# Patient Record
Sex: FEMALE | Race: WHITE | Employment: OTHER | URBAN - METROPOLITAN AREA
[De-identification: names, ages, dates, MRNs, and addresses within clinical notes are randomized per-mention and may not be internally consistent; named-entity substitution may affect disease eponyms.]

---

## 2023-10-22 ENCOUNTER — APPOINTMENT (OUTPATIENT)
Dept: GENERAL RADIOLOGY | Age: 83
DRG: 382 | End: 2023-10-22
Payer: MEDICARE

## 2023-10-22 ENCOUNTER — APPOINTMENT (OUTPATIENT)
Dept: CT IMAGING | Age: 83
DRG: 382 | End: 2023-10-22
Payer: MEDICARE

## 2023-10-22 ENCOUNTER — HOSPITAL ENCOUNTER (INPATIENT)
Age: 83
LOS: 2 days | Discharge: HOME HEALTH | DRG: 382 | End: 2023-10-24
Attending: EMERGENCY MEDICINE | Admitting: INTERNAL MEDICINE
Payer: MEDICARE

## 2023-10-22 DIAGNOSIS — R11.2 INTRACTABLE VOMITING WITH NAUSEA: Primary | ICD-10-CM

## 2023-10-22 DIAGNOSIS — R33.9 URINARY RETENTION: ICD-10-CM

## 2023-10-22 DIAGNOSIS — K20.90 ESOPHAGITIS: ICD-10-CM

## 2023-10-22 DIAGNOSIS — R94.31 PROLONGED Q-T INTERVAL ON ECG: ICD-10-CM

## 2023-10-22 DIAGNOSIS — R11.2 NAUSEA AND VOMITING, UNSPECIFIED VOMITING TYPE: ICD-10-CM

## 2023-10-22 PROBLEM — R13.10 ODYNOPHAGIA: Status: ACTIVE | Noted: 2023-10-22

## 2023-10-22 PROBLEM — K92.0 COFFEE GROUND EMESIS: Status: ACTIVE | Noted: 2023-10-22

## 2023-10-22 PROBLEM — I16.0 HYPERTENSIVE URGENCY: Status: ACTIVE | Noted: 2023-10-22

## 2023-10-22 LAB
ALBUMIN SERPL-MCNC: 3.8 G/DL (ref 3.4–5)
ALP SERPL-CCNC: 87 U/L (ref 40–129)
ALT SERPL-CCNC: 15 U/L (ref 10–40)
AMORPH SED URNS QL MICRO: ABNORMAL /HPF
ANION GAP SERPL CALCULATED.3IONS-SCNC: 14 MMOL/L (ref 3–16)
AST SERPL-CCNC: 20 U/L (ref 15–37)
BACTERIA URNS QL MICRO: ABNORMAL /HPF
BASOPHILS # BLD: 0.1 K/UL (ref 0–0.2)
BASOPHILS NFR BLD: 0.7 %
BILIRUB DIRECT SERPL-MCNC: <0.2 MG/DL (ref 0–0.3)
BILIRUB INDIRECT SERPL-MCNC: NORMAL MG/DL (ref 0–1)
BILIRUB SERPL-MCNC: 0.3 MG/DL (ref 0–1)
BILIRUB UR QL STRIP.AUTO: NEGATIVE
BUN SERPL-MCNC: 15 MG/DL (ref 7–20)
CALCIUM SERPL-MCNC: 9.2 MG/DL (ref 8.3–10.6)
CHLORIDE SERPL-SCNC: 103 MMOL/L (ref 99–110)
CLARITY UR: CLEAR
CO2 SERPL-SCNC: 22 MMOL/L (ref 21–32)
COLOR UR: YELLOW
CREAT SERPL-MCNC: 0.9 MG/DL (ref 0.6–1.2)
DEPRECATED RDW RBC AUTO: 14.1 % (ref 12.4–15.4)
EOSINOPHIL # BLD: 0.1 K/UL (ref 0–0.6)
EOSINOPHIL NFR BLD: 1.1 %
EPI CELLS #/AREA URNS HPF: ABNORMAL /HPF (ref 0–5)
GFR SERPLBLD CREATININE-BSD FMLA CKD-EPI: >60 ML/MIN/{1.73_M2}
GLUCOSE SERPL-MCNC: 148 MG/DL (ref 70–99)
GLUCOSE UR STRIP.AUTO-MCNC: NEGATIVE MG/DL
HCT VFR BLD AUTO: 39.4 % (ref 36–48)
HGB BLD-MCNC: 13.4 G/DL (ref 12–16)
HGB UR QL STRIP.AUTO: ABNORMAL
KETONES UR STRIP.AUTO-MCNC: ABNORMAL MG/DL
LACTATE BLDV-SCNC: 0.9 MMOL/L (ref 0.4–1.9)
LEUKOCYTE ESTERASE UR QL STRIP.AUTO: NEGATIVE
LIPASE SERPL-CCNC: 41 U/L (ref 13–60)
LYMPHOCYTES # BLD: 1.7 K/UL (ref 1–5.1)
LYMPHOCYTES NFR BLD: 19.1 %
MCH RBC QN AUTO: 31 PG (ref 26–34)
MCHC RBC AUTO-ENTMCNC: 34.1 G/DL (ref 31–36)
MCV RBC AUTO: 91 FL (ref 80–100)
MONOCYTES # BLD: 0.5 K/UL (ref 0–1.3)
MONOCYTES NFR BLD: 5.8 %
NEUTROPHILS # BLD: 6.3 K/UL (ref 1.7–7.7)
NEUTROPHILS NFR BLD: 73.3 %
NITRITE UR QL STRIP.AUTO: NEGATIVE
NT-PROBNP SERPL-MCNC: 160 PG/ML (ref 0–449)
PH UR STRIP.AUTO: 7.5 [PH] (ref 5–8)
PLATELET # BLD AUTO: 234 K/UL (ref 135–450)
PMV BLD AUTO: 9.9 FL (ref 5–10.5)
POTASSIUM SERPL-SCNC: 3.7 MMOL/L (ref 3.5–5.1)
PROT SERPL-MCNC: 7.3 G/DL (ref 6.4–8.2)
PROT UR STRIP.AUTO-MCNC: 30 MG/DL
RBC # BLD AUTO: 4.33 M/UL (ref 4–5.2)
RBC #/AREA URNS HPF: ABNORMAL /HPF (ref 0–4)
SARS-COV-2 RDRP RESP QL NAA+PROBE: NOT DETECTED
SODIUM SERPL-SCNC: 139 MMOL/L (ref 136–145)
SP GR UR STRIP.AUTO: 1.01 (ref 1–1.03)
TROPONIN, HIGH SENSITIVITY: 10 NG/L (ref 0–14)
TROPONIN, HIGH SENSITIVITY: 10 NG/L (ref 0–14)
UA COMPLETE W REFLEX CULTURE PNL UR: ABNORMAL
UA DIPSTICK W REFLEX MICRO PNL UR: YES
URN SPEC COLLECT METH UR: ABNORMAL
UROBILINOGEN UR STRIP-ACNC: 0.2 E.U./DL
WBC # BLD AUTO: 8.6 K/UL (ref 4–11)
WBC #/AREA URNS HPF: ABNORMAL /HPF (ref 0–5)

## 2023-10-22 PROCEDURE — 6370000000 HC RX 637 (ALT 250 FOR IP): Performed by: INTERNAL MEDICINE

## 2023-10-22 PROCEDURE — 2500000003 HC RX 250 WO HCPCS: Performed by: EMERGENCY MEDICINE

## 2023-10-22 PROCEDURE — 93005 ELECTROCARDIOGRAM TRACING: CPT | Performed by: EMERGENCY MEDICINE

## 2023-10-22 PROCEDURE — C9113 INJ PANTOPRAZOLE SODIUM, VIA: HCPCS | Performed by: EMERGENCY MEDICINE

## 2023-10-22 PROCEDURE — 2580000003 HC RX 258: Performed by: INTERNAL MEDICINE

## 2023-10-22 PROCEDURE — 87635 SARS-COV-2 COVID-19 AMP PRB: CPT

## 2023-10-22 PROCEDURE — 51702 INSERT TEMP BLADDER CATH: CPT

## 2023-10-22 PROCEDURE — 83880 ASSAY OF NATRIURETIC PEPTIDE: CPT

## 2023-10-22 PROCEDURE — A4216 STERILE WATER/SALINE, 10 ML: HCPCS | Performed by: EMERGENCY MEDICINE

## 2023-10-22 PROCEDURE — 83690 ASSAY OF LIPASE: CPT

## 2023-10-22 PROCEDURE — 83605 ASSAY OF LACTIC ACID: CPT

## 2023-10-22 PROCEDURE — 80076 HEPATIC FUNCTION PANEL: CPT

## 2023-10-22 PROCEDURE — C9113 INJ PANTOPRAZOLE SODIUM, VIA: HCPCS | Performed by: INTERNAL MEDICINE

## 2023-10-22 PROCEDURE — 2580000003 HC RX 258: Performed by: EMERGENCY MEDICINE

## 2023-10-22 PROCEDURE — 71045 X-RAY EXAM CHEST 1 VIEW: CPT

## 2023-10-22 PROCEDURE — 1200000000 HC SEMI PRIVATE

## 2023-10-22 PROCEDURE — 96375 TX/PRO/DX INJ NEW DRUG ADDON: CPT

## 2023-10-22 PROCEDURE — 6360000002 HC RX W HCPCS: Performed by: INTERNAL MEDICINE

## 2023-10-22 PROCEDURE — 96374 THER/PROPH/DIAG INJ IV PUSH: CPT

## 2023-10-22 PROCEDURE — 85025 COMPLETE CBC W/AUTO DIFF WBC: CPT

## 2023-10-22 PROCEDURE — 6360000004 HC RX CONTRAST MEDICATION: Performed by: EMERGENCY MEDICINE

## 2023-10-22 PROCEDURE — 99285 EMERGENCY DEPT VISIT HI MDM: CPT

## 2023-10-22 PROCEDURE — 74177 CT ABD & PELVIS W/CONTRAST: CPT

## 2023-10-22 PROCEDURE — 96361 HYDRATE IV INFUSION ADD-ON: CPT

## 2023-10-22 PROCEDURE — 36415 COLL VENOUS BLD VENIPUNCTURE: CPT

## 2023-10-22 PROCEDURE — 6360000002 HC RX W HCPCS: Performed by: EMERGENCY MEDICINE

## 2023-10-22 PROCEDURE — 81001 URINALYSIS AUTO W/SCOPE: CPT

## 2023-10-22 PROCEDURE — 96376 TX/PRO/DX INJ SAME DRUG ADON: CPT

## 2023-10-22 PROCEDURE — 6360000002 HC RX W HCPCS: Performed by: NURSE PRACTITIONER

## 2023-10-22 PROCEDURE — 84484 ASSAY OF TROPONIN QUANT: CPT

## 2023-10-22 PROCEDURE — 80048 BASIC METABOLIC PNL TOTAL CA: CPT

## 2023-10-22 RX ORDER — DIPHENHYDRAMINE HYDROCHLORIDE 50 MG/ML
12.5 INJECTION INTRAMUSCULAR; INTRAVENOUS ONCE
Status: COMPLETED | OUTPATIENT
Start: 2023-10-22 | End: 2023-10-22

## 2023-10-22 RX ORDER — LEVOTHYROXINE SODIUM 0.05 MG/1
50 TABLET ORAL DAILY
COMMUNITY

## 2023-10-22 RX ORDER — SODIUM CHLORIDE 9 MG/ML
INJECTION, SOLUTION INTRAVENOUS CONTINUOUS
Status: DISCONTINUED | OUTPATIENT
Start: 2023-10-22 | End: 2023-10-24

## 2023-10-22 RX ORDER — DEMECLOCYCLINE HYDROCHLORIDE 150 MG/1
150 TABLET, FILM COATED ORAL 2 TIMES DAILY
COMMUNITY

## 2023-10-22 RX ORDER — AMLODIPINE BESYLATE 2.5 MG/1
2.5 TABLET ORAL DAILY
Status: DISCONTINUED | OUTPATIENT
Start: 2023-10-22 | End: 2023-10-24 | Stop reason: HOSPADM

## 2023-10-22 RX ORDER — SODIUM CHLORIDE 0.9 % (FLUSH) 0.9 %
5-40 SYRINGE (ML) INJECTION PRN
Status: DISCONTINUED | OUTPATIENT
Start: 2023-10-22 | End: 2023-10-24 | Stop reason: HOSPADM

## 2023-10-22 RX ORDER — PANTOPRAZOLE SODIUM 40 MG/10ML
40 INJECTION, POWDER, LYOPHILIZED, FOR SOLUTION INTRAVENOUS ONCE
Status: COMPLETED | OUTPATIENT
Start: 2023-10-22 | End: 2023-10-22

## 2023-10-22 RX ORDER — SODIUM CHLORIDE 9 MG/ML
INJECTION, SOLUTION INTRAVENOUS PRN
Status: DISCONTINUED | OUTPATIENT
Start: 2023-10-22 | End: 2023-10-24 | Stop reason: HOSPADM

## 2023-10-22 RX ORDER — SODIUM CHLORIDE, SODIUM LACTATE, POTASSIUM CHLORIDE, AND CALCIUM CHLORIDE .6; .31; .03; .02 G/100ML; G/100ML; G/100ML; G/100ML
1000 INJECTION, SOLUTION INTRAVENOUS ONCE
Status: COMPLETED | OUTPATIENT
Start: 2023-10-22 | End: 2023-10-22

## 2023-10-22 RX ORDER — HYDRALAZINE HYDROCHLORIDE 20 MG/ML
10 INJECTION INTRAMUSCULAR; INTRAVENOUS EVERY 8 HOURS PRN
Status: DISCONTINUED | OUTPATIENT
Start: 2023-10-22 | End: 2023-10-24 | Stop reason: HOSPADM

## 2023-10-22 RX ORDER — PANTOPRAZOLE SODIUM 40 MG/10ML
40 INJECTION, POWDER, LYOPHILIZED, FOR SOLUTION INTRAVENOUS 2 TIMES DAILY
Status: DISCONTINUED | OUTPATIENT
Start: 2023-10-22 | End: 2023-10-24 | Stop reason: HOSPADM

## 2023-10-22 RX ORDER — ACETAMINOPHEN 325 MG/1
650 TABLET ORAL EVERY 6 HOURS PRN
Status: DISCONTINUED | OUTPATIENT
Start: 2023-10-22 | End: 2023-10-24 | Stop reason: HOSPADM

## 2023-10-22 RX ORDER — LORAZEPAM 2 MG/ML
1 INJECTION INTRAMUSCULAR ONCE
Status: COMPLETED | OUTPATIENT
Start: 2023-10-22 | End: 2023-10-22

## 2023-10-22 RX ORDER — SODIUM CHLORIDE 0.9 % (FLUSH) 0.9 %
5-40 SYRINGE (ML) INJECTION EVERY 12 HOURS SCHEDULED
Status: DISCONTINUED | OUTPATIENT
Start: 2023-10-22 | End: 2023-10-24 | Stop reason: HOSPADM

## 2023-10-22 RX ORDER — AMLODIPINE BESYLATE 2.5 MG/1
2.5 TABLET ORAL DAILY
Status: ON HOLD | COMMUNITY
End: 2023-10-24 | Stop reason: SDUPTHER

## 2023-10-22 RX ORDER — ACETAMINOPHEN 650 MG/1
650 SUPPOSITORY RECTAL EVERY 6 HOURS PRN
Status: DISCONTINUED | OUTPATIENT
Start: 2023-10-22 | End: 2023-10-24 | Stop reason: HOSPADM

## 2023-10-22 RX ORDER — ONDANSETRON 4 MG/1
4 TABLET, ORALLY DISINTEGRATING ORAL EVERY 8 HOURS PRN
Status: DISCONTINUED | OUTPATIENT
Start: 2023-10-22 | End: 2023-10-24 | Stop reason: HOSPADM

## 2023-10-22 RX ORDER — ONDANSETRON 2 MG/ML
4 INJECTION INTRAMUSCULAR; INTRAVENOUS EVERY 6 HOURS PRN
Status: DISCONTINUED | OUTPATIENT
Start: 2023-10-22 | End: 2023-10-24 | Stop reason: HOSPADM

## 2023-10-22 RX ORDER — POLYETHYLENE GLYCOL 3350 17 G/17G
17 POWDER, FOR SOLUTION ORAL DAILY PRN
Status: DISCONTINUED | OUTPATIENT
Start: 2023-10-22 | End: 2023-10-24 | Stop reason: HOSPADM

## 2023-10-22 RX ADMIN — LORAZEPAM 1 MG: 2 INJECTION INTRAMUSCULAR; INTRAVENOUS at 08:03

## 2023-10-22 RX ADMIN — IOPAMIDOL 75 ML: 755 INJECTION, SOLUTION INTRAVENOUS at 06:18

## 2023-10-22 RX ADMIN — HYDRALAZINE HYDROCHLORIDE 10 MG: 20 INJECTION, SOLUTION INTRAMUSCULAR; INTRAVENOUS at 20:44

## 2023-10-22 RX ADMIN — DIPHENHYDRAMINE HYDROCHLORIDE 12.5 MG: 50 INJECTION, SOLUTION INTRAMUSCULAR; INTRAVENOUS at 04:33

## 2023-10-22 RX ADMIN — SODIUM CHLORIDE, PRESERVATIVE FREE 10 ML: 5 INJECTION INTRAVENOUS at 12:12

## 2023-10-22 RX ADMIN — PANTOPRAZOLE SODIUM 40 MG: 40 INJECTION, POWDER, FOR SOLUTION INTRAVENOUS at 08:03

## 2023-10-22 RX ADMIN — AMLODIPINE BESYLATE 2.5 MG: 2.5 TABLET ORAL at 15:31

## 2023-10-22 RX ADMIN — SODIUM CHLORIDE, POTASSIUM CHLORIDE, SODIUM LACTATE AND CALCIUM CHLORIDE 1000 ML: 600; 310; 30; 20 INJECTION, SOLUTION INTRAVENOUS at 04:33

## 2023-10-22 RX ADMIN — PANTOPRAZOLE SODIUM 40 MG: 40 INJECTION, POWDER, FOR SOLUTION INTRAVENOUS at 20:44

## 2023-10-22 RX ADMIN — SODIUM CHLORIDE: 9 INJECTION, SOLUTION INTRAVENOUS at 12:11

## 2023-10-22 RX ADMIN — FAMOTIDINE 20 MG: 10 INJECTION, SOLUTION INTRAVENOUS at 04:33

## 2023-10-22 RX ADMIN — DIPHENHYDRAMINE HYDROCHLORIDE 12.5 MG: 50 INJECTION INTRAMUSCULAR; INTRAVENOUS at 06:11

## 2023-10-22 ASSESSMENT — LIFESTYLE VARIABLES
HOW MANY STANDARD DRINKS CONTAINING ALCOHOL DO YOU HAVE ON A TYPICAL DAY: PATIENT DOES NOT DRINK
HOW OFTEN DO YOU HAVE A DRINK CONTAINING ALCOHOL: NEVER

## 2023-10-22 ASSESSMENT — PAIN - FUNCTIONAL ASSESSMENT: PAIN_FUNCTIONAL_ASSESSMENT: NONE - DENIES PAIN

## 2023-10-22 NOTE — ED TRIAGE NOTES
Patient arrived in the ER with c/o n/v, headache. Patient states that she had been feeling good all day. However around 3:00 am today she started having n/v. Patient was given 4mg of Zofran by EMS.

## 2023-10-22 NOTE — ED NOTES
ED TO INPATIENT SBAR HANDOFF    Patient Name: Ayla Auguste   :  1940  78 y.o. MRN:  [id number]  Preferred Name  Loree Glass  ED Room #:  H78/D04-77  Family/Caregiver Present yes   Restraints no   Sitter no   Sepsis Risk Score Sepsis Risk Score: 0.91    Situation  Code Status: No Order No additional code details. Allergies: Antivert [meclizine]  Weight: Patient Vitals for the past 96 hrs (Last 3 readings):   Weight   10/22/23 0357 68.3 kg (150 lb 8 oz)     Arrived from: home  Chief Complaint:   Chief Complaint   Patient presents with    Emesis    Nausea    Abdominal Pain    Headache     Hospital Problem/Diagnosis:  Principal Problem:    Intractable nausea and vomiting  Resolved Problems:    * No resolved hospital problems. *    Imaging:   CT ABDOMEN PELVIS W IV CONTRAST Additional Contrast? None   Final Result      1. Distal esophageal wall thickening with adjacent fluid suggesting esophagitis. Small hiatal hernia. 2. Indeterminate 1 cm hypodensity in the lateral mid right kidney. Recommend   follow-up ultrasound in 6 months. 3. Severe bladder distention. 4. Severe noninflamed colonic diverticulosis. 5. Severe vascular calcifications. XR CHEST PORTABLE   Final Result   Impression:      Subtle opacity in the left costophrenic angle, favored to represent subsegmental atelectasis or possibly a small pleural effusion. Electronically signed by Heide Girard MD        Abnormal labs: Abnormal Labs Reviewed - No abnormal labs to display  Critical values: no     Abnormal Assessment Findings: long QT    Background  History: History reviewed. No pertinent past medical history.     Assessment    Vitals/MEWS: MEWS Score: 1  Level of Consciousness: Alert (0)   Vitals:    10/22/23 0500 10/22/23 0600 10/22/23 0642 10/22/23 0700   BP: (!) 184/90   (!) 152/58   Pulse: 79 82 77 69   Resp: 16 14 14 14   Temp:       TempSrc:       SpO2: 100% 97% 96% 93%   Weight:       Height:         O2 Flow Rate: O2 Device: None (Room air)    Cardiac Rhythm:    Pain Assessment:  [x] Verbal [] Pricilla Push Scale  Pain Scale: Pain Assessment  Pain Assessment: None - Denies Pain  Last documented pain score (0-10 scale)    Last documented pain medication administered: N/A  Mental Status: oriented and alert  Orientation Level:    NIH Score:    C-SSRS: Risk of Suicide: No Risk  Bedside swallow:    Oskar Coma Scale (GCS): Oskar Coma Scale  Eye Opening: Spontaneous  Best Verbal Response: Oriented  Best Motor Response: Obeys commands  Oskar Coma Scale Score: 15  Active LDA's:   Peripheral IV 10/22/23 Right Antecubital (Active)   Site Assessment Clean, dry & intact 10/22/23 0432     PO Status: pending  Pertinent or High Risk Medications/Drips: no   If Yes, please provide details:   Pending Blood Product Administration: no       You may also review the ED PT Care Timeline found under the Summary Nursing Index tab. Recommendation    Pending orders N/A  Plan for Discharge (if known):    Additional Comments: none   If any further questions, please call Sending RN at [phone]    Electronically signed by: Electronically signed by Nelson Jackson RN on 10/22/2023 at 7:52 AM       Myriam Ho  10/22/23 8655

## 2023-10-22 NOTE — PLAN OF CARE
[facility] Hospitalist brief note  Consult received. Case reviewed with ER physician    Full note to follow. No primary care provider on file.     Thanks  Fernando Holt MD

## 2023-10-22 NOTE — PROGRESS NOTES
Patient admitted to room 5324 from ED. Patient is A&O x 1. VSS. Patient oriented to the room all safety measures in place. Patient given IS and SCDs at this time. Admission orders released and patient 4 eyes completed. Admission documentation completed. No other needs are noted at this time.     [x] Bed alarm on and cord plugged into wall  [x] Bed in lowest position  [x] Call light and bedside table within reach  [x] Patient educated on all safety measures  []Oxygen connected to wall (if applicable)     Nurse 1 Esignature: Electronically signed by Kaylynn Wallace RN on 10/22/23 at 4:44 PM EDT  Nurse 2 Esignature: {Esignature:[id number]}

## 2023-10-22 NOTE — PROGRESS NOTES
4 Eyes Skin Assessment     NAME:  Daron Langston  YOB: 1940  MEDICAL RECORD NUMBER:  [id number]    The patient is being assessed for  Admission    I agree that at least one RN has performed a thorough Head to Toe Skin Assessment on the patient. ALL assessment sites listed below have been assessed. Areas assessed by both nurses:    Head, Face, Ears, Shoulders, Back, Chest, Arms, Elbows, Hands, Sacrum. Buttock, Coccyx, Ischium, Legs. Feet and Heels, and Under Medical Devices         Does the Patient have a Wound?  No noted wound(s)       Isauro Prevention initiated by RN: No  Wound Care Orders initiated by RN: No    Pressure Injury (Stage 3,4, Unstageable, DTI, NWPT, and Complex wounds) if present, place Wound referral order by RN under : No    New Ostomies, if present place, Ostomy referral order under : No     Nurse 1 eSignature: Electronically signed by Caitlin Salamanca RN on 10/22/23 at 10:32 AM EDT    **SHARE this note so that the co-signing nurse can place an eSignature**    Nurse 2 eSignature: Electronically signed by Radha Marte RN on 10/22/23 at 4:43 PM EDT

## 2023-10-22 NOTE — ACP (ADVANCE CARE PLANNING)
Advanced Care Planning Note. Purpose of Encounter: Advanced care planning in light of acute and chronic deteriorating medical conditions. Parties In Attendance: Zach Phoenix (POA), West Crespo MD  (myself)    Decisional Capacity: Yes    Subjective: Patient/family understand in this voluntary conversation that Reynold Holstein continues to deteriorate and discuss what inteventions and plans patient would want implemented in light of the following diagnoses: Active Hospital Problems    Diagnosis Date Noted    Intractable nausea and vomiting [R11.2] 10/22/2023    Esophagitis [K20.90] 10/22/2023    Odynophagia [R13.10] 10/22/2023    Hypertensive urgency [I16.0] 10/22/2023    Coffee ground emesis [K92.0] 10/22/2023         Objective: Pt has been having chronic decline in functional status with increased dependence on others for ADLs  Increased frequency of Hospitalizations. Likelihood of further hospitalizations with likelihood of escalation of medical interventions with the expectation of returning to a diminishing baseline level of functionality. Discussion highlights: I discussed with patient the ramifications of their acute and chronic medical problems- and the likely outcomes expected, both in terms of statistics, and as part of my experience seeing patients with similar conditions. Goals of Care Determination:   Patient is a living well, Sunni daughter is the medical power of   Zach Phoenix says she is always wished that she would not want to live prolonging measures and would like to be DNR  Patient remains limited code  However if endoscopy or any reversible infection treatment is needed they are okay with that    Plan: Continue to educate patient on medical conditions and the choices available regarding possible outcomes with regard to goals of care and code status.        Time spent on Advanced care Plannin+ minutes    [place] Crespo

## 2023-10-22 NOTE — CONSULTS
Clinical Pharmacy Progress Note  Medication History     Pharmacy consulted to verify home medication list by Dr. Aleks Hendrickson. List of of current medications patient is taking is in process. Home Medication list in EPIC updated to reflect changes noted below. Patient's home pharmacy: [address] [place] [address] ([phone])     Source of information: [facility] fills via Surescripts  Discussed at bedside with pt's granddaughter as pt is \"zonked\" (per her words). Pt unable to provide useful info at this time. Pt's granddaughter told me another family member has a medication list from patient's wallet and would be at the hospital later this afternoon with the list.  However, family sent a photo of the med list to Dr. Aleks Hendrickson, who then provided it to me. Medication list provided by family is a screen shot list of prescriptions printed and given to patient by [facility]. Spoke with pharmacist at [facility] to get most recent fill dates of all meds to determine what patient is likely still taking.     Meds patient is likely still taking (added to med list):  Amlodipine 2.5mg po daily  Demeclocycline 150mg po BID  Levothyroxine 50 mcg po daily    Medications that have not been filled in > 3 months - likely NOT taking (not added to med list):  Carvedilol 12.5mg BID -  last filled 1/8/23 90 day supply (would have been out by early April)  Donepezil 10mg daily - last filled 4/10/23 30 day supply (would have been out by mid-May)  Cetirizine 10mg daily - last filled 7/3/23 30 day supply (would have been out by early August)  Esomeprazole 40mg BID - last filled 7/3/23 30 day supply (would have been out by early August)  Furosemide 20mg daily - last filled 12/12/2022 30 day supply (would have been out mid-January)  Rosuvastatin 10mg daily - last filled 12/28/2022 90 day supply (would have been out late March)  Ranolazine 500mg po BID - last filled 7/4/23 90 day supply (would have been out early October, and prior fills were exactly 90 days apart)    Please call with questions--  Thanks--  Kendy Farfan, KellyD, BCPS, BCGP  [id number] ([facility])   10/22/2023 12:42 PM      Current Outpatient Medications   Medication Instructions    amLODIPine (NORVASC) 2.5 mg, Oral, DAILY    demeclocycline (DECLOMYCIN) 150 mg, Oral, 2 TIMES DAILY, After meals    levothyroxine (SYNTHROID) 50 mcg, Oral, DAILY

## 2023-10-22 NOTE — PROGRESS NOTES
Patient arrived on unit via stretcher. Bed alarm utilized, call light within reach. Family bedside, provider bedside.

## 2023-10-22 NOTE — PLAN OF CARE
Problem: Discharge Planning  Goal: Discharge to home or other facility with appropriate resources  10/22/2023 1637 by Pippa Brink RN  Outcome: Progressing     Problem: Safety - Adult  Goal: Free from fall injury  10/22/2023 1637 by Pippa Brink RN  Outcome: Progressing  Family bedside at this time. Video surveillance utilized for safety d/t pt being lethargic. Bed alarm utilized, call light within reach.

## 2023-10-22 NOTE — CONSULTS
[facility]  GI Consultation                                                                 Patient: Wan Buitrago  : 1940       Date:  10/22/2023    Subjective:       History of Present Illness  Patient is a 78 y.o.  female admitted with Urinary retention [R33.9]  Esophagitis [K20.90]  Prolonged Q-T interval on ECG [R94.31]  Intractable nausea and vomiting [R11.2]  Intractable vomiting with nausea [R11.2] who is seen in consult for dysphagia and nausea. She has past medical history of CAD s/p triple bypass, CVA, hypertension, possible dementia, gets all her care at [facility] in [place]  She was visiting her sister in [place] [place] on Friday by granddaughter  She started feeling unwell throughout Saturday, she states her vertigo is acting up, she was having headache, she took some Tylenol. She had some pizza last night which she was unable to keep down. She also reports that for the last couple of weeks of months she also has problems with swallowing food food gets stuck and hurts. Granddaughter present at bedside states she has been complaining a lot of pain of pain in the neck and lower chest after eating. At 3 AM she woke up being sick had abdominal pain, nausea vomiting,   Per report she had brown coffee colored vomitus, multiple episodes, no reported blood. Has had dysphagia with esophageal dilation in the past management several years ago. She has intermittent dysphagia although nausea is since improved. History reviewed. No pertinent past medical history. History reviewed. No pertinent surgical history. Past Endoscopic History above    Admission Meds  No current facility-administered medications on file prior to encounter.      Current Outpatient Medications on File Prior to Encounter   Medication Sig Dispense Refill    amLODIPine (NORVASC) 2.5 MG tablet Take 1 tablet by mouth daily      demeclocycline (DECLOMYCIN) 150 MG tablet Take 1 hypodensity in the lateral mid right kidney. Recommend  follow-up ultrasound in 6 months. 3. Severe bladder distention. 4. Severe noninflamed colonic diverticulosis. 5. Severe vascular calcifications                 Assessment:     Principal Problem:    Intractable nausea and vomiting  Active Problems:    Esophagitis    Odynophagia    Hypertensive urgency    Coffee ground emesis  Resolved Problems:    * No resolved hospital problems. *      Nausea, has since resolved. Intermittent dysphagia with history of esophageal dilation in the past as well as esophagitis and esophageal thickening on CT. Recommendations:     EGD is reasonable. I gave her the options of treating empirically with PPI versus EGD. There was family present in the room and all of them wanted to go ahead and do an EGD. We will schedule that tomorrow. Orders written. Thank you for the opportunity to participate in [facility] care.     Aki Gamble MD

## 2023-10-23 ENCOUNTER — ANESTHESIA (OUTPATIENT)
Dept: ENDOSCOPY | Age: 83
DRG: 382 | End: 2023-10-23
Payer: MEDICARE

## 2023-10-23 ENCOUNTER — ANESTHESIA EVENT (OUTPATIENT)
Dept: ENDOSCOPY | Age: 83
DRG: 382 | End: 2023-10-23
Payer: MEDICARE

## 2023-10-23 LAB
ALBUMIN SERPL-MCNC: 3.4 G/DL (ref 3.4–5)
ANION GAP SERPL CALCULATED.3IONS-SCNC: 8 MMOL/L (ref 3–16)
ANION GAP SERPL CALCULATED.3IONS-SCNC: 8 MMOL/L (ref 3–16)
BASOPHILS # BLD: 0.1 K/UL (ref 0–0.2)
BASOPHILS NFR BLD: 0.8 %
BUN SERPL-MCNC: 6 MG/DL (ref 7–20)
BUN SERPL-MCNC: 7 MG/DL (ref 7–20)
CALCIUM SERPL-MCNC: 8.5 MG/DL (ref 8.3–10.6)
CALCIUM SERPL-MCNC: 8.5 MG/DL (ref 8.3–10.6)
CHLORIDE SERPL-SCNC: 104 MMOL/L (ref 99–110)
CHLORIDE SERPL-SCNC: 104 MMOL/L (ref 99–110)
CO2 SERPL-SCNC: 25 MMOL/L (ref 21–32)
CO2 SERPL-SCNC: 25 MMOL/L (ref 21–32)
CREAT SERPL-MCNC: 0.7 MG/DL (ref 0.6–1.2)
CREAT SERPL-MCNC: 0.7 MG/DL (ref 0.6–1.2)
DEPRECATED RDW RBC AUTO: 14.2 % (ref 12.4–15.4)
EKG ATRIAL RATE: 90 BPM
EKG DIAGNOSIS: NORMAL
EKG P AXIS: 65 DEGREES
EKG P-R INTERVAL: 146 MS
EKG Q-T INTERVAL: 394 MS
EKG QRS DURATION: 92 MS
EKG QTC CALCULATION (BAZETT): 481 MS
EKG R AXIS: 61 DEGREES
EKG T AXIS: 61 DEGREES
EKG VENTRICULAR RATE: 90 BPM
EOSINOPHIL # BLD: 0 K/UL (ref 0–0.6)
EOSINOPHIL NFR BLD: 0.8 %
GFR SERPLBLD CREATININE-BSD FMLA CKD-EPI: >60 ML/MIN/{1.73_M2}
GFR SERPLBLD CREATININE-BSD FMLA CKD-EPI: >60 ML/MIN/{1.73_M2}
GLUCOSE SERPL-MCNC: 95 MG/DL (ref 70–99)
GLUCOSE SERPL-MCNC: 95 MG/DL (ref 70–99)
HCT VFR BLD AUTO: 35.6 % (ref 36–48)
HGB BLD-MCNC: 12.2 G/DL (ref 12–16)
LYMPHOCYTES # BLD: 2 K/UL (ref 1–5.1)
LYMPHOCYTES NFR BLD: 33 %
MAGNESIUM SERPL-MCNC: 1.8 MG/DL (ref 1.8–2.4)
MCH RBC QN AUTO: 31.2 PG (ref 26–34)
MCHC RBC AUTO-ENTMCNC: 34.3 G/DL (ref 31–36)
MCV RBC AUTO: 90.9 FL (ref 80–100)
MONOCYTES # BLD: 0.5 K/UL (ref 0–1.3)
MONOCYTES NFR BLD: 9 %
NEUTROPHILS # BLD: 3.4 K/UL (ref 1.7–7.7)
NEUTROPHILS NFR BLD: 56.4 %
PHOSPHATE SERPL-MCNC: 2.9 MG/DL (ref 2.5–4.9)
PLATELET # BLD AUTO: 205 K/UL (ref 135–450)
PMV BLD AUTO: 9.5 FL (ref 5–10.5)
POTASSIUM SERPL-SCNC: 3.5 MMOL/L (ref 3.5–5.1)
POTASSIUM SERPL-SCNC: 3.5 MMOL/L (ref 3.5–5.1)
RBC # BLD AUTO: 3.92 M/UL (ref 4–5.2)
SODIUM SERPL-SCNC: 137 MMOL/L (ref 136–145)
SODIUM SERPL-SCNC: 137 MMOL/L (ref 136–145)
T4 FREE SERPL-MCNC: 1.2 NG/DL (ref 0.9–1.8)
TSH SERPL DL<=0.005 MIU/L-ACNC: 2.87 UIU/ML (ref 0.27–4.2)
WBC # BLD AUTO: 6.1 K/UL (ref 4–11)

## 2023-10-23 PROCEDURE — C9113 INJ PANTOPRAZOLE SODIUM, VIA: HCPCS | Performed by: INTERNAL MEDICINE

## 2023-10-23 PROCEDURE — 36415 COLL VENOUS BLD VENIPUNCTURE: CPT

## 2023-10-23 PROCEDURE — 3609012400 HC EGD TRANSORAL BIOPSY SINGLE/MULTIPLE: Performed by: INTERNAL MEDICINE

## 2023-10-23 PROCEDURE — 6360000002 HC RX W HCPCS: Performed by: NURSE ANESTHETIST, CERTIFIED REGISTERED

## 2023-10-23 PROCEDURE — 80069 RENAL FUNCTION PANEL: CPT

## 2023-10-23 PROCEDURE — 2580000003 HC RX 258: Performed by: INTERNAL MEDICINE

## 2023-10-23 PROCEDURE — 6370000000 HC RX 637 (ALT 250 FOR IP): Performed by: PHYSICIAN ASSISTANT

## 2023-10-23 PROCEDURE — 84443 ASSAY THYROID STIM HORMONE: CPT

## 2023-10-23 PROCEDURE — 7100000011 HC PHASE II RECOVERY - ADDTL 15 MIN: Performed by: INTERNAL MEDICINE

## 2023-10-23 PROCEDURE — 3700000000 HC ANESTHESIA ATTENDED CARE: Performed by: INTERNAL MEDICINE

## 2023-10-23 PROCEDURE — 7100000010 HC PHASE II RECOVERY - FIRST 15 MIN: Performed by: INTERNAL MEDICINE

## 2023-10-23 PROCEDURE — 2709999900 HC NON-CHARGEABLE SUPPLY: Performed by: INTERNAL MEDICINE

## 2023-10-23 PROCEDURE — 6360000002 HC RX W HCPCS: Performed by: NURSE PRACTITIONER

## 2023-10-23 PROCEDURE — 6360000002 HC RX W HCPCS: Performed by: INTERNAL MEDICINE

## 2023-10-23 PROCEDURE — 88312 SPECIAL STAINS GROUP 1: CPT

## 2023-10-23 PROCEDURE — 1200000000 HC SEMI PRIVATE

## 2023-10-23 PROCEDURE — 0DB68ZX EXCISION OF STOMACH, VIA NATURAL OR ARTIFICIAL OPENING ENDOSCOPIC, DIAGNOSTIC: ICD-10-PCS | Performed by: INTERNAL MEDICINE

## 2023-10-23 PROCEDURE — 93005 ELECTROCARDIOGRAM TRACING: CPT | Performed by: INTERNAL MEDICINE

## 2023-10-23 PROCEDURE — 6370000000 HC RX 637 (ALT 250 FOR IP): Performed by: INTERNAL MEDICINE

## 2023-10-23 PROCEDURE — 88305 TISSUE EXAM BY PATHOLOGIST: CPT

## 2023-10-23 PROCEDURE — 2500000003 HC RX 250 WO HCPCS: Performed by: NURSE ANESTHETIST, CERTIFIED REGISTERED

## 2023-10-23 PROCEDURE — 83735 ASSAY OF MAGNESIUM: CPT

## 2023-10-23 PROCEDURE — 85025 COMPLETE CBC W/AUTO DIFF WBC: CPT

## 2023-10-23 PROCEDURE — 6370000000 HC RX 637 (ALT 250 FOR IP)

## 2023-10-23 PROCEDURE — 6360000002 HC RX W HCPCS

## 2023-10-23 PROCEDURE — 84439 ASSAY OF FREE THYROXINE: CPT

## 2023-10-23 PROCEDURE — 0DB48ZX EXCISION OF ESOPHAGOGASTRIC JUNCTION, VIA NATURAL OR ARTIFICIAL OPENING ENDOSCOPIC, DIAGNOSTIC: ICD-10-PCS | Performed by: INTERNAL MEDICINE

## 2023-10-23 RX ORDER — LIDOCAINE HYDROCHLORIDE 20 MG/ML
INJECTION, SOLUTION INFILTRATION; PERINEURAL PRN
Status: DISCONTINUED | OUTPATIENT
Start: 2023-10-23 | End: 2023-10-23 | Stop reason: SDUPTHER

## 2023-10-23 RX ORDER — PROPOFOL 10 MG/ML
INJECTION, EMULSION INTRAVENOUS PRN
Status: DISCONTINUED | OUTPATIENT
Start: 2023-10-23 | End: 2023-10-23 | Stop reason: SDUPTHER

## 2023-10-23 RX ORDER — TAMSULOSIN HYDROCHLORIDE 0.4 MG/1
0.4 CAPSULE ORAL DAILY
Status: DISCONTINUED | OUTPATIENT
Start: 2023-10-23 | End: 2023-10-24 | Stop reason: HOSPADM

## 2023-10-23 RX ORDER — POTASSIUM CHLORIDE 20 MEQ/1
40 TABLET, EXTENDED RELEASE ORAL ONCE
Status: COMPLETED | OUTPATIENT
Start: 2023-10-23 | End: 2023-10-23

## 2023-10-23 RX ORDER — PROPOFOL 10 MG/ML
INJECTION, EMULSION INTRAVENOUS CONTINUOUS PRN
Status: DISCONTINUED | OUTPATIENT
Start: 2023-10-23 | End: 2023-10-23 | Stop reason: SDUPTHER

## 2023-10-23 RX ORDER — MAGNESIUM SULFATE IN WATER 40 MG/ML
2000 INJECTION, SOLUTION INTRAVENOUS ONCE
Status: COMPLETED | OUTPATIENT
Start: 2023-10-23 | End: 2023-10-23

## 2023-10-23 RX ADMIN — AMLODIPINE BESYLATE 2.5 MG: 2.5 TABLET ORAL at 09:04

## 2023-10-23 RX ADMIN — TAMSULOSIN HYDROCHLORIDE 0.4 MG: 0.4 CAPSULE ORAL at 15:27

## 2023-10-23 RX ADMIN — PROPOFOL 40 MG: 10 INJECTION, EMULSION INTRAVENOUS at 13:59

## 2023-10-23 RX ADMIN — PANTOPRAZOLE SODIUM 40 MG: 40 INJECTION, POWDER, FOR SOLUTION INTRAVENOUS at 20:10

## 2023-10-23 RX ADMIN — MAGNESIUM SULFATE HEPTAHYDRATE 2000 MG: 40 INJECTION, SOLUTION INTRAVENOUS at 11:24

## 2023-10-23 RX ADMIN — PANTOPRAZOLE SODIUM 40 MG: 40 INJECTION, POWDER, FOR SOLUTION INTRAVENOUS at 09:04

## 2023-10-23 RX ADMIN — HYDRALAZINE HYDROCHLORIDE 10 MG: 20 INJECTION, SOLUTION INTRAMUSCULAR; INTRAVENOUS at 15:16

## 2023-10-23 RX ADMIN — PROPOFOL 150 MCG/KG/MIN: 10 INJECTION, EMULSION INTRAVENOUS at 13:59

## 2023-10-23 RX ADMIN — LIDOCAINE HYDROCHLORIDE 50 MG: 20 INJECTION, SOLUTION INFILTRATION; PERINEURAL at 13:59

## 2023-10-23 RX ADMIN — ONDANSETRON 4 MG: 2 INJECTION INTRAMUSCULAR; INTRAVENOUS at 09:11

## 2023-10-23 RX ADMIN — SODIUM CHLORIDE, PRESERVATIVE FREE 10 ML: 5 INJECTION INTRAVENOUS at 09:05

## 2023-10-23 RX ADMIN — POTASSIUM CHLORIDE 40 MEQ: 1500 TABLET, EXTENDED RELEASE ORAL at 09:05

## 2023-10-23 ASSESSMENT — ENCOUNTER SYMPTOMS
DIARRHEA: 0
VOMITING: 0
NAUSEA: 0
CONSTIPATION: 0

## 2023-10-23 ASSESSMENT — PAIN - FUNCTIONAL ASSESSMENT
PAIN_FUNCTIONAL_ASSESSMENT: WONG-BAKER FACES

## 2023-10-23 ASSESSMENT — LIFESTYLE VARIABLES: SMOKING_STATUS: 0

## 2023-10-23 NOTE — PROGRESS NOTES
Vital Signs BP elevated, given hydralazine (last 154/73)  Pleasantly disoriented, alert to self  Infusing NS @ 100mL/hr  Voiding via ying output clear yellow  Ambulating x1 to bathroom  Denies pain

## 2023-10-23 NOTE — PROCEDURES
Endoscopy Note    Patient: Fe Cope  : 1940  Acct#:     Procedure: Esophagogastroduodenoscopy with biopsy    Date:  10/23/2023     Surgeon:  Himanshu Anthony MD,     Referring Physician:  No primary care provider on file. Preoperative Diagnosis:    58-year-old female admitted with intractable nausea and vomiting      Anesthesia: MAC anesthesia      Consent:  The patient or their legal guardian has signed an informed consent, and is aware of the potential risks, benefits, alternatives, and potential complications of this procedure. These include, but are not limited to hemorrhage, bleeding, post procedural pain, perforation, phlebitis, aspiration, hypotension, hypoxia, cardiovascular events such as arryhthmia, and possibly death. Description of Procedure: The patient was then taken to the endoscopy suite, placed in the left lateral decubitus position and the above IV sedation was administrered. The Olympus video endoscope was placed through the patient's oropharynx without difficulty to the extent of the 2nd portion of the duodenum. The patient tolerated the procedure well and was taken to the post anesthesia care unit in good condition. Complications: None  EBL: none      Findings:  Esophagus:  Visualization of the esophagus demonstrated grade C esophagitis seen in the distal esophagus. Linear esophageal ulcers with white base seen near the GE junction, biopsies were obtained. GE junction at 40 cm. The mucosa in the proximal and midesophagus appeared otherwise unremarkable. Stomach: The scope was then advanced into the stomach. Both forward and retroflexed views of the stomach were obtained. Visualization of the gastric body and antrum demonstrated mild diffuse erythematous mucosa seen biopsies obtained. A retroflexed exam of the gastric cardia and fundus demonstrated mild erythematous mucosa.   The pylorus was patent and the scope was advanced into the

## 2023-10-23 NOTE — PROGRESS NOTES
Pt has been A&O to person, intermittently to place, disoriented to time and situation. Pt remains cooperative and pleasant, VSS aside from elevated BP upon return from Endo, hydralazine given per order and BP lowered to 160s. Pt's ying was leaking shortly before evening shift change but RN found that the ying was kinking above [place], RN fixed kink and bed change was completed.      Electronically signed by Gabriela Alas RN on 10/23/2023 at 7:40 PM

## 2023-10-23 NOTE — PROGRESS NOTES
10/23/23 1232   Encounter Summary   Encounter Overview/Reason  Spiritual/Emotional Needs   Service Provided For: Patient and family together  (Niece and granddaughter at bedside)   Referral/Consult From: [address] Children;Family members   Last Encounter  10/23/23  (visit w/pt & family, support, conversation ke 10/23/23)   Complexity of Encounter Moderate   Begin Time 1215   End Time  1230   Total Time Calculated 15 min   Spiritual/Emotional needs   Type Spiritual Support   Assessment/Intervention/Outcome   Assessment Calm; Hopeful;Peaceful   Intervention Active listening;Discussed belief system/Druze practices/bernie; Explored/Affirmed feelings, thoughts, concerns;Nurtured Hope;Sustaining Presence/Ministry of presence   Outcome Acceptance;Encouraged;Expressed Gratitude     Dada Staton.

## 2023-10-24 VITALS
HEIGHT: 60 IN | RESPIRATION RATE: 16 BRPM | OXYGEN SATURATION: 97 % | WEIGHT: 147.05 LBS | BODY MASS INDEX: 28.87 KG/M2 | SYSTOLIC BLOOD PRESSURE: 151 MMHG | DIASTOLIC BLOOD PRESSURE: 80 MMHG | HEART RATE: 84 BPM | TEMPERATURE: 97.8 F

## 2023-10-24 LAB
ALBUMIN SERPL-MCNC: 3.2 G/DL (ref 3.4–5)
ANION GAP SERPL CALCULATED.3IONS-SCNC: 10 MMOL/L (ref 3–16)
BASOPHILS # BLD: 0 K/UL (ref 0–0.2)
BASOPHILS NFR BLD: 0.6 %
BUN SERPL-MCNC: 6 MG/DL (ref 7–20)
CALCIUM SERPL-MCNC: 8.2 MG/DL (ref 8.3–10.6)
CHLORIDE SERPL-SCNC: 102 MMOL/L (ref 99–110)
CO2 SERPL-SCNC: 24 MMOL/L (ref 21–32)
CREAT SERPL-MCNC: 0.8 MG/DL (ref 0.6–1.2)
DEPRECATED RDW RBC AUTO: 14.2 % (ref 12.4–15.4)
EKG ATRIAL RATE: 70 BPM
EKG DIAGNOSIS: NORMAL
EKG P AXIS: 32 DEGREES
EKG P-R INTERVAL: 138 MS
EKG Q-T INTERVAL: 390 MS
EKG QRS DURATION: 92 MS
EKG QTC CALCULATION (BAZETT): 421 MS
EKG R AXIS: 81 DEGREES
EKG T AXIS: 189 DEGREES
EKG VENTRICULAR RATE: 70 BPM
EOSINOPHIL # BLD: 0.1 K/UL (ref 0–0.6)
EOSINOPHIL NFR BLD: 1.4 %
GFR SERPLBLD CREATININE-BSD FMLA CKD-EPI: >60 ML/MIN/{1.73_M2}
GLUCOSE SERPL-MCNC: 104 MG/DL (ref 70–99)
HCT VFR BLD AUTO: 34.8 % (ref 36–48)
HGB BLD-MCNC: 12 G/DL (ref 12–16)
LYMPHOCYTES # BLD: 1.5 K/UL (ref 1–5.1)
LYMPHOCYTES NFR BLD: 20.1 %
MCH RBC QN AUTO: 31 PG (ref 26–34)
MCHC RBC AUTO-ENTMCNC: 34.4 G/DL (ref 31–36)
MCV RBC AUTO: 90.2 FL (ref 80–100)
MONOCYTES # BLD: 0.7 K/UL (ref 0–1.3)
MONOCYTES NFR BLD: 9.5 %
NEUTROPHILS # BLD: 4.9 K/UL (ref 1.7–7.7)
NEUTROPHILS NFR BLD: 68.4 %
PHOSPHATE SERPL-MCNC: 3 MG/DL (ref 2.5–4.9)
PLATELET # BLD AUTO: 177 K/UL (ref 135–450)
PMV BLD AUTO: 9.6 FL (ref 5–10.5)
POTASSIUM SERPL-SCNC: 3.6 MMOL/L (ref 3.5–5.1)
RBC # BLD AUTO: 3.86 M/UL (ref 4–5.2)
SODIUM SERPL-SCNC: 136 MMOL/L (ref 136–145)
WBC # BLD AUTO: 7.2 K/UL (ref 4–11)

## 2023-10-24 PROCEDURE — 85025 COMPLETE CBC W/AUTO DIFF WBC: CPT

## 2023-10-24 PROCEDURE — 2580000003 HC RX 258: Performed by: INTERNAL MEDICINE

## 2023-10-24 PROCEDURE — 97161 PT EVAL LOW COMPLEX 20 MIN: CPT

## 2023-10-24 PROCEDURE — 6370000000 HC RX 637 (ALT 250 FOR IP): Performed by: PHYSICIAN ASSISTANT

## 2023-10-24 PROCEDURE — 97530 THERAPEUTIC ACTIVITIES: CPT

## 2023-10-24 PROCEDURE — 93010 ELECTROCARDIOGRAM REPORT: CPT | Performed by: INTERNAL MEDICINE

## 2023-10-24 PROCEDURE — 6370000000 HC RX 637 (ALT 250 FOR IP): Performed by: INTERNAL MEDICINE

## 2023-10-24 PROCEDURE — 97535 SELF CARE MNGMENT TRAINING: CPT

## 2023-10-24 PROCEDURE — C9113 INJ PANTOPRAZOLE SODIUM, VIA: HCPCS | Performed by: INTERNAL MEDICINE

## 2023-10-24 PROCEDURE — 97116 GAIT TRAINING THERAPY: CPT

## 2023-10-24 PROCEDURE — 80069 RENAL FUNCTION PANEL: CPT

## 2023-10-24 PROCEDURE — 6360000002 HC RX W HCPCS: Performed by: INTERNAL MEDICINE

## 2023-10-24 PROCEDURE — 36415 COLL VENOUS BLD VENIPUNCTURE: CPT

## 2023-10-24 PROCEDURE — 97166 OT EVAL MOD COMPLEX 45 MIN: CPT

## 2023-10-24 RX ORDER — PANTOPRAZOLE SODIUM 40 MG/1
40 TABLET, DELAYED RELEASE ORAL
Qty: 180 TABLET | Refills: 1 | Status: SHIPPED | OUTPATIENT
Start: 2023-10-24

## 2023-10-24 RX ORDER — AMLODIPINE BESYLATE 5 MG/1
5 TABLET ORAL DAILY
Qty: 30 TABLET | Refills: 0 | Status: SHIPPED | OUTPATIENT
Start: 2023-10-24 | End: 2023-11-23

## 2023-10-24 RX ORDER — TAMSULOSIN HYDROCHLORIDE 0.4 MG/1
0.4 CAPSULE ORAL DAILY
Qty: 30 CAPSULE | Refills: 3 | Status: SHIPPED | OUTPATIENT
Start: 2023-10-25

## 2023-10-24 RX ORDER — CARVEDILOL 6.25 MG/1
6.25 TABLET ORAL 2 TIMES DAILY
Qty: 60 TABLET | Refills: 3 | Status: SHIPPED | OUTPATIENT
Start: 2023-10-24

## 2023-10-24 RX ORDER — LEVOTHYROXINE SODIUM 0.05 MG/1
50 TABLET ORAL DAILY
Status: DISCONTINUED | OUTPATIENT
Start: 2023-10-24 | End: 2023-10-24 | Stop reason: HOSPADM

## 2023-10-24 RX ORDER — AMLODIPINE BESYLATE 2.5 MG/1
5 TABLET ORAL DAILY
Qty: 60 TABLET | Refills: 0 | Status: SHIPPED | OUTPATIENT
Start: 2023-10-24 | End: 2023-10-24 | Stop reason: SDUPTHER

## 2023-10-24 RX ADMIN — SODIUM CHLORIDE, PRESERVATIVE FREE 10 ML: 5 INJECTION INTRAVENOUS at 10:52

## 2023-10-24 RX ADMIN — PANTOPRAZOLE SODIUM 40 MG: 40 INJECTION, POWDER, FOR SOLUTION INTRAVENOUS at 10:52

## 2023-10-24 RX ADMIN — TAMSULOSIN HYDROCHLORIDE 0.4 MG: 0.4 CAPSULE ORAL at 10:52

## 2023-10-24 RX ADMIN — AMLODIPINE BESYLATE 2.5 MG: 2.5 TABLET ORAL at 10:52

## 2023-10-24 RX ADMIN — LEVOTHYROXINE SODIUM 50 MCG: 50 TABLET ORAL at 10:52

## 2023-10-24 NOTE — DISCHARGE INSTRUCTIONS
For high blood pressure:  - Please take Amlodipine 5mg one tablet by mouth daily.  - Please take Coreg 6.25mg one tablet by mouth 2 times daily. - Check blood pressure regularly at home to ensure blood pressure medication regiment is working. For Esophagitis:  - Please take Protonix 40mg one tablet 2 times daily. For Urinary Retention:  - Please take Flomax 0.4mg one capsule by mouth daily.  - Please discuss with this medication with your primary care doctor and if it can be discontinued. Please call [address] [place] [address] at [phone] in one week's time for your biopsy results. Please make sure you follow up with your primary care doctor in one week's time. Please arrange a follow up visit with a gastroenterologist in 6-8 weeks time for a repeat EGD.

## 2023-10-24 NOTE — PROGRESS NOTES
Physical Therapy  Facility/Department: [facility]  Physical Therapy Initial Assessment, Treatment and Possible D/c    Name: Rk Javier  : 1940  MRN: [id number]  Date of Service: 10/24/2023    Discharge Recommendations:  Rk Javier scored a 19 /24 on the AM-PAC short mobility form. Current research shows that an AM-PAC score of 18 or greater is typically associated with a discharge to the patient's home setting. Based on the patient's AM-PAC score and their current functional mobility deficits, it is recommended that the patient have 2-3 sessions per week of Physical Therapy at d/c to increase the patient's independence. At this time, this patient demonstrates the endurance and safety to discharge home with (home services) and a follow up treatment frequency of 2-3x/wk. Please see assessment section for further patient specific details. If patient discharges prior to next session this note will serve as a discharge summary. Please see below for the latest assessment towards goals. 24 hour supervision or assist, Home with Home health PT   PT Equipment Recommendations  Equipment Needed: No      Patient Diagnosis(es): The primary encounter diagnosis was Intractable vomiting with nausea. Diagnoses of Esophagitis, Urinary retention, Prolonged Q-T interval on ECG, and Nausea and vomiting, unspecified vomiting type were also pertinent to this visit. Past Medical History:  has no past medical history on file. Past Surgical History:  has a past surgical history that includes Upper gastrointestinal endoscopy (N/A, 10/23/2023). Assessment   Assessment: Pt is 79 yo F admitted on 10/22/23 with N and V. Pt here visiting family, typically lives in [place]. Pt lives alone and is independent at baseline. Pt has family in the area, 8 children. Presently, pt moving with SBA to CGA due to tendency to veer to the R with gait. Rec home with 24 hour assist initially and home PT, pt agrees.   No Independent  Ambulation Assistance: Independent  Transfer Assistance: Independent  Active : Yes  Leisure & Hobbies: watch game shows, puzzles  Additional Comments: Pt's family will provide 24 hour assist    Vision/Hearing  Vision  Vision Exceptions: Wears glasses for reading  Hearing  Hearing Exceptions: Left hearing aid      Cognition   Orientation  Overall Orientation Status: Within Functional Limits  Cognition  Overall Cognitive Status: WFL     Objective   AROM RLE (degrees)  RLE AROM: WFL  AROM LLE (degrees)  LLE AROM : WFL    Strength RLE  Strength RLE: WFL  Strength LLE  Strength LLE: WFL    Bed mobility  Supine to Sit: Modified independent  Scooting: Independent    Transfers  Sit to Stand: Stand by assistance  Stand to Sit: Stand by assistance    Ambulation  Other Apparatus: Right (AFO)  Assistance: Stand by assistance;Contact guard assistance  Quality of Gait: Tends to veer to the R  Gait Deviations: Increased JUDY; Decreased step length;Decreased step height  Distance: 100 feet    Balance  Sitting - Static: Fair  Standing - Static: Fair  Standing - Dynamic: Fair;-    Goals  Short Term Goals  Time Frame for Short Term Goals: by discharge  Short Term Goal 1: Pt will transfer sit to and from stand with supervision  Short Term Goal 2: Pt will ambulate 200 feet with supervision    Education  Patient Education  Education Given To: Patient  Education Provided: Role of Therapy; Fall Prevention Strategies  Education Outcome: Verbalized understanding    Therapy Time   Individual Concurrent Group Co-treatment   Time In 1133         Time Out 1220         Minutes 47             Timed Code Treatment Minutes:   32    Total Treatment Minutes:   [address], PT

## 2023-10-24 NOTE — PROGRESS NOTES
Hernandez removed this morning at 10am.  Pt urinated in brief at 12pm.  No complaints of pain. Pt ate 50% of breakfast and 600mL of fluids with no complaints of nausea of abdominal pain. Will continue to monitor.

## 2023-10-24 NOTE — PROGRESS NOTES
Occupational Therapy  Facility/Department: [address] [place] [address] Initial Assessment /Treatment/Discharge     Name: Ladonna Mccullough  : 1940  MRN: [id number]  Date of Service: 10/24/2023    Discharge Recommendations:   Ladonna Mccullough scored a  on the AM-[facility] ADL Inpatient form. At this time, no further OT is recommended upon discharge. Recommend patient returns to prior setting with prior services. OT Equipment Recommendations  Equipment Needed: No       Patient Diagnosis(es): The primary encounter diagnosis was Intractable vomiting with nausea. Diagnoses of Esophagitis, Urinary retention, Prolonged Q-T interval on ECG, and Nausea and vomiting, unspecified vomiting type were also pertinent to this visit. Past Medical History:  has no past medical history on file. Past Surgical History:  has a past surgical history that includes Upper gastrointestinal endoscopy (N/A, 10/23/2023). Assessment   Assessment: Pt demonstrates good functional independence. Pt is from home and independent. Currently, pt req SBA for mobility and ADL. Family has planned to have someone with her 24 hrs/day for a few days. No acute OT needs identified. Pt expresses no concerns regarding discharge to home. Prognosis: Good  Decision Making: Medium Complexity  REQUIRES OT FOLLOW-UP: No  Activity Tolerance  Activity Tolerance Comments: Occasional c/o dizziness        Plan   Occupational Therapy Plan  Additional Comments: Discharge pt from acute OT     Restrictions  Position Activity Restriction  Other position/activity restrictions: Up as tolerated    Subjective   General  Chart Reviewed: Yes  Additional Pertinent Hx: Pt admitted 10/22/23 with Emesis, Nausea, Abdominal Pain, and Headache. 10/22 EGD     CT abd/pelvis= Distal esophageal wall thickening with adjacent fluid suggesting esophagitis. Small hiatal hernia. 2.Indeterminate 1 cm hypodensity in the lateral mid right kidney.  3.Severe bladder

## 2023-10-24 NOTE — PROGRESS NOTES
Comprehensive Nutrition Assessment    Type and Reason for Visit:  Positive Nutrition Screen    Nutrition Recommendations/Plan:   Continue Dysphagia Minced and Moist; GI bland (GERD/peptic ulcer). Start Ensure HP+ daily. Malnutrition Assessment:  Malnutrition Status: At risk for malnutrition (Comment) (10/24/23 1106)    Context:  Acute Illness     Findings of the 6 clinical characteristics of malnutrition:  Energy Intake:  Mild decrease in energy intake (Comment)    Nutrition Assessment:    +nutrition screen for wt loss. Pt with PMH of CAD s/p triple bypass, CVA, and HTN who presented with intractable nausea with coffee ground emesis. Pt reported poor PO intakes PTA r/t odynophagia. Pt has hx of dilations. Pt s/p EGD- showed esophagitis, distal esophageal ulcers, and gastritis. No wt hx in EMR. Pt ate >50% of breakfast this AM on Minced and Moist- GI bland diet. Will add daily ONS and continue to encourage PO intakes. Nutrition Related Findings:    No BM or edema; Labs reviewed Wound Type: None       Current Nutrition Intake & Therapies:    Average Meal Intake: 51-75%  Average Supplements Intake: None Ordered  ADULT DIET; Dysphagia - Minced and Moist; GI [place] (GERD/Peptic Ulcer)    Anthropometric Measures:  Height: 152.4 cm (5')  Ideal Body Weight (IBW): 100 lbs (45 kg)       Current Body Weight: 66.7 kg (147 lb), 147 % IBW. Current BMI (kg/m2): 28.7                          BMI Categories: Overweight (BMI 25.0-29. 9)    Estimated Daily Nutrient Needs:        Energy (kcal/day): 8069-0076 kcal (20-25 kcal/kg 67 kg CBW)     Protein (g/day): 55-64 gm (1.2-1.4gm/kg IBW)     Fluid (ml/day): 1 mL/kcal    Nutrition Diagnosis:   Inadequate oral intake related to other (comment), altered GI structure (n/v) as evidenced by poor intake prior to admission    Nutrition Interventions:   Food and/or Nutrient Delivery: Continue Current Diet, Start Oral Nutrition Supplement  Nutrition Education/Counseling: Education not

## 2023-10-24 NOTE — CARE COORDINATION
[facility] contacted [address] [phone], they have accepted this referral. Faxed orders to [phone] for [facility] by 10/26  Electronically signed by Amee Shipley LPN on 91/03/7114 at 1:09 PM

## 2023-10-24 NOTE — CARE COORDINATION
Case Management Assessment  Initial Evaluation    Date/Time of Evaluation: 10/24/2023 12:23 PM  Assessment Completed by: Rajiv Heath RN    If patient is discharged prior to next notation, then this note serves as note for discharge by case management. Patient Name: Jad Kearney                   YOB: 1940  Diagnosis: Urinary retention [R33.9]  Esophagitis [K20.90]  Prolonged Q-T interval on ECG [R94.31]  Intractable nausea and vomiting [R11.2]  Intractable vomiting with nausea [R11.2]                   Date / Time: 10/22/2023  3:43 AM    Patient Admission Status: Inpatient   Readmission Risk (Low < 19, Mod (19-27), High > 27): Readmission Risk Score: 10.5    Current PCP: No primary care provider on file. PCP verified by CM? No    Chart Reviewed: Yes      History Provided by: Patient, Medical Record  Patient Orientation: Alert and Oriented    Patient Cognition: Alert    Hospitalization in the last 30 days (Readmission):  No    If yes, Readmission Assessment in CM Navigator will be completed and shown below. Advance Directives:      Code Status: Limited   Patient's Primary Decision Maker is: Legal Next of Kin      Discharge Planning:    Patient lives with: Alone Type of Home: House  Primary Care Giver: Self  Patient Support Systems include: Children, Family Members, Friends/Neighbors   Current Financial resources: Medicare  Current community resources: ECF/Home Care  Current services prior to admission: None            Current DME:              Type of Home Care services:  None    ADLS  Prior functional level: Independent in ADLs/IADLs  Current functional level: Assistance with the following:, Mobility    PT AM-PAC:   /24  OT AM-PAC:   /24    Family can provide assistance at DC: Yes  Would you like Case Management to discuss the discharge plan with any other family members/significant others, and if so, who?  No  Plans to Return to Present Housing: Yes  Other Identified Issues/Barriers to

## 2023-10-24 NOTE — PROGRESS NOTES
Pt verbalized understanding of D/C instructions and prescription medications. Pt transferred to hospital entrance via wheelchair. Pt and belongings picked up by granddaughter via personal vehicle to be transported home.

## 2023-10-31 NOTE — PROGRESS NOTES
Physician Progress Note      Suraj Cope  CSN #:                  [id number]  :                       1940  ADMIT DATE:       10/22/2023 3:43 AM  DISCH DATE:        10/24/2023 4:07 PM  RESPONDING  PROVIDER #:        Tasha Ortez MD          QUERY TEXT:    Patient admitted 10/22- 10/24 with N/V d/t Grade C Esophagitis. Pt noted to   have Diffuse gastritis. If possible, please document if etiology of N/V &   coffee ground emesis is due to: The medical record reflects the following:  Risk Factors:  Grade C Esophagitis, diffuse gastritis  Clinical Indicators: EGD:  grade C esophagitis seen in the distal esophagus. Linear esophageal ulcers with white base seen near the GE junction. Visualization of the gastric body and antrum demonstrated mild diffuse   erythematous mucosa seen biopsies obtained. Diffuse gastritis. Path: Gastric:   mild reactive gastropathy ( chemical gastritis ). Esophageal ulcer biopsy:   Squamous esophageal mucosa with mild acute esophagitis with ulceration. Per   d/c sum \"Intractable Nausea and Vomiting 2/2 Grade C Esophagitis\". FOBT not   obtained. HGB: 13.4- 12. Treatment: Protonix, CT Abd, GI c/s w/ EGD/ path  Options provided:  -- Nausea with vomiting due to both Grade C Esophagitis and gastritis  -- Nausea with vomiting due to both Grade C Esophagitis only  -- Other - I will add my own diagnosis  -- Disagree - Not applicable / Not valid  -- Disagree - Clinically unable to determine / Unknown  -- Refer to Clinical Documentation Reviewer    PROVIDER RESPONSE TEXT:    This patient has Nausea with vomiting due to both Grade C Esophagitis and   gastritis. Query created by: Selam Sethi on 10/31/2023 12:57 PM      QUERY TEXT:    Patient admitted 10/22- 10/24 with N/V & coffee ground emesis d/t Grade C   Esophagitis & urinary retention.  Documentation reflects \"Lethargic/possible   metabolic encephalopathy, patient had gotten Ativan in the

## 2024-03-14 ENCOUNTER — TELEPHONE (OUTPATIENT)
Dept: CARDIAC SURGERY | Facility: CLINIC | Age: 84
End: 2024-03-14
Payer: MEDICARE

## 2024-03-14 NOTE — TELEPHONE ENCOUNTER
Received this referral from Lindsay Strong for subclavian artery stenosis. Our office has tried multiple times to reach office to refax referral but was not successfully.     Called [phone] and it was Cardiovascular unit in the [facility] they did not know of this patient or referral and Lindsay is a hospitialist with there in [place]. I have canceled this referral until we get records.

## 2024-08-31 NOTE — ANESTHESIA POSTPROCEDURE EVALUATION
Department of Anesthesiology  Postprocedure Note    Patient: Jr Powell  MRN: [id number]  [address] [facility] [place]: 1940  Date of evaluation: 10/23/2023      Procedure Summary     Date: 10/23/23 Room / Location: [address] [place] [address] [facility]    Anesthesia Start: 5873 Anesthesia Stop: 5272    Procedure: EGD BIOPSY Diagnosis:       Nausea and vomiting, unspecified vomiting type      (Nausea and vomiting, unspecified vomiting type [R11.2])    Surgeons: Jihan Calderon MD Responsible Provider: Josh Herman DO    Anesthesia Type: MAC ASA Status: 2          Anesthesia Type: No value filed.     Gaby Phase I: Gaby Score: 10    Gaby Phase II: Gaby Score: 10      Anesthesia Post Evaluation    Patient location during evaluation: PACU  Patient participation: complete - patient participated  Level of consciousness: awake  Pain score: 0  Nausea & Vomiting: no nausea and no vomiting  Complications: no  Cardiovascular status: blood pressure returned to baseline  Respiratory status: acceptable  Hydration status: euvolemic  Pain management: adequate
Patient requests all Lab, Cardiology, and Radiology Results on their Discharge Instructions

## 2025-02-25 ENCOUNTER — APPOINTMENT (OUTPATIENT)
Dept: MRI IMAGING | Facility: HOSPITAL | Age: 85
End: 2025-02-25
Payer: MEDICARE

## 2025-02-25 ENCOUNTER — HOSPITAL ENCOUNTER (INPATIENT)
Facility: HOSPITAL | Age: 85
LOS: 8 days | Discharge: HOME | End: 2025-03-05
Attending: EMERGENCY MEDICINE | Admitting: INTERNAL MEDICINE
Payer: MEDICARE

## 2025-02-25 ENCOUNTER — APPOINTMENT (OUTPATIENT)
Dept: CT IMAGING | Facility: HOSPITAL | Age: 85
End: 2025-02-25
Payer: MEDICARE

## 2025-02-25 ENCOUNTER — APPOINTMENT (OUTPATIENT)
Dept: GENERAL RADIOLOGY | Facility: HOSPITAL | Age: 85
End: 2025-02-25
Payer: MEDICARE

## 2025-02-25 DIAGNOSIS — R55 NEAR SYNCOPE: ICD-10-CM

## 2025-02-25 DIAGNOSIS — Z86.39 HISTORY OF HYPERLIPIDEMIA: ICD-10-CM

## 2025-02-25 DIAGNOSIS — R42 DIZZINESS: ICD-10-CM

## 2025-02-25 DIAGNOSIS — Z86.69 HISTORY OF OBSTRUCTIVE SLEEP APNEA: ICD-10-CM

## 2025-02-25 DIAGNOSIS — I77.1 SUBCLAVIAN ARTERY STENOSIS: ICD-10-CM

## 2025-02-25 DIAGNOSIS — Z86.73 HISTORY OF STROKE: ICD-10-CM

## 2025-02-25 DIAGNOSIS — G45.8 SUBCLAVIAN STEAL SYNDROME OF RIGHT SUBCLAVIAN ARTERY: Primary | ICD-10-CM

## 2025-02-25 DIAGNOSIS — Z86.79 HISTORY OF HYPERTENSION: ICD-10-CM

## 2025-02-25 DIAGNOSIS — Z86.79 HISTORY OF CORONARY ARTERY DISEASE: ICD-10-CM

## 2025-02-25 PROBLEM — E78.5 DYSLIPIDEMIA: Status: ACTIVE | Noted: 2025-02-25

## 2025-02-25 PROBLEM — I63.9 STROKE: Status: ACTIVE | Noted: 2025-02-25

## 2025-02-25 PROBLEM — K20.90 ESOPHAGITIS: Status: ACTIVE | Noted: 2023-10-22

## 2025-02-25 PROBLEM — I65.29 CAROTID STENOSIS: Status: ACTIVE | Noted: 2025-02-25

## 2025-02-25 PROBLEM — K44.9 PARAESOPHAGEAL HIATAL HERNIA: Status: ACTIVE | Noted: 2025-02-25

## 2025-02-25 PROBLEM — R09.89 BRUIT OF RIGHT CAROTID ARTERY: Status: ACTIVE | Noted: 2025-02-25

## 2025-02-25 PROBLEM — M17.12 DEGENERATIVE ARTHRITIS OF LEFT KNEE: Status: ACTIVE | Noted: 2025-02-25

## 2025-02-25 PROBLEM — E22.2 SIADH (SYNDROME OF INAPPROPRIATE ADH PRODUCTION): Status: ACTIVE | Noted: 2025-02-25

## 2025-02-25 PROBLEM — H81.10 BENIGN PAROXYSMAL POSITIONAL VERTIGO: Status: ACTIVE | Noted: 2025-02-25

## 2025-02-25 PROBLEM — E55.9 VITAMIN D DEFICIENCY: Status: ACTIVE | Noted: 2025-02-25

## 2025-02-25 PROBLEM — E03.9 HYPOTHYROID: Status: ACTIVE | Noted: 2025-02-25

## 2025-02-25 LAB
ALBUMIN SERPL-MCNC: 3.8 G/DL (ref 3.5–5.2)
ALBUMIN/GLOB SERPL: 1.4 G/DL
ALP SERPL-CCNC: 58 U/L (ref 39–117)
ALT SERPL W P-5'-P-CCNC: 13 U/L (ref 1–33)
ANION GAP SERPL CALCULATED.3IONS-SCNC: 11 MMOL/L (ref 5–15)
AST SERPL-CCNC: 26 U/L (ref 1–32)
BASOPHILS # BLD AUTO: 0.07 10*3/MM3 (ref 0–0.2)
BASOPHILS NFR BLD AUTO: 1.1 % (ref 0–1.5)
BILIRUB SERPL-MCNC: 0.2 MG/DL (ref 0–1.2)
BUN SERPL-MCNC: 18 MG/DL (ref 8–23)
BUN/CREAT SERPL: 15.9 (ref 7–25)
CALCIUM SPEC-SCNC: 8.9 MG/DL (ref 8.6–10.5)
CHLORIDE SERPL-SCNC: 103 MMOL/L (ref 98–107)
CHOLEST SERPL-MCNC: 124 MG/DL (ref 0–200)
CO2 SERPL-SCNC: 25 MMOL/L (ref 22–29)
CREAT BLDA-MCNC: 1.2 MG/DL (ref 0.6–1.3)
CREAT SERPL-MCNC: 1.13 MG/DL (ref 0.57–1)
DEPRECATED RDW RBC AUTO: 46.4 FL (ref 37–54)
EGFRCR SERPLBLD CKD-EPI 2021: 47.5 ML/MIN/1.73
EOSINOPHIL # BLD AUTO: 0.2 10*3/MM3 (ref 0–0.4)
EOSINOPHIL NFR BLD AUTO: 3.1 % (ref 0.3–6.2)
ERYTHROCYTE [DISTWIDTH] IN BLOOD BY AUTOMATED COUNT: 14.3 % (ref 12.3–15.4)
GLOBULIN UR ELPH-MCNC: 2.8 GM/DL
GLUCOSE SERPL-MCNC: 99 MG/DL (ref 65–99)
HBA1C MFR BLD: 5.5 % (ref 4.8–5.6)
HCT VFR BLD AUTO: 37.9 % (ref 34–46.6)
HDLC SERPL-MCNC: 58 MG/DL (ref 40–60)
HGB BLD-MCNC: 12.3 G/DL (ref 12–15.9)
IMM GRANULOCYTES # BLD AUTO: 0.03 10*3/MM3 (ref 0–0.05)
IMM GRANULOCYTES NFR BLD AUTO: 0.5 % (ref 0–0.5)
INR PPP: 0.99 (ref 0.89–1.12)
LDLC SERPL CALC-MCNC: 45 MG/DL (ref 0–100)
LDLC/HDLC SERPL: 0.73 {RATIO}
LYMPHOCYTES # BLD AUTO: 2.13 10*3/MM3 (ref 0.7–3.1)
LYMPHOCYTES NFR BLD AUTO: 32.7 % (ref 19.6–45.3)
MCH RBC QN AUTO: 28.5 PG (ref 26.6–33)
MCHC RBC AUTO-ENTMCNC: 32.5 G/DL (ref 31.5–35.7)
MCV RBC AUTO: 87.9 FL (ref 79–97)
MONOCYTES # BLD AUTO: 0.65 10*3/MM3 (ref 0.1–0.9)
MONOCYTES NFR BLD AUTO: 10 % (ref 5–12)
NEUTROPHILS NFR BLD AUTO: 3.43 10*3/MM3 (ref 1.7–7)
NEUTROPHILS NFR BLD AUTO: 52.6 % (ref 42.7–76)
NRBC BLD AUTO-RTO: 0 /100 WBC (ref 0–0.2)
PLATELET # BLD AUTO: 282 10*3/MM3 (ref 140–450)
PMV BLD AUTO: 11.6 FL (ref 6–12)
POTASSIUM SERPL-SCNC: 4.6 MMOL/L (ref 3.5–5.2)
PROT SERPL-MCNC: 6.6 G/DL (ref 6–8.5)
PROTHROMBIN TIME: 13.2 SECONDS (ref 12.2–14.5)
RBC # BLD AUTO: 4.31 10*6/MM3 (ref 3.77–5.28)
SODIUM SERPL-SCNC: 139 MMOL/L (ref 136–145)
TRIGL SERPL-MCNC: 118 MG/DL (ref 0–150)
TROPONIN T SERPL HS-MCNC: 17 NG/L
TSH SERPL DL<=0.05 MIU/L-ACNC: 3.02 UIU/ML (ref 0.27–4.2)
VLDLC SERPL-MCNC: 21 MG/DL (ref 5–40)
WBC NRBC COR # BLD AUTO: 6.51 10*3/MM3 (ref 3.4–10.8)

## 2025-02-25 PROCEDURE — 83036 HEMOGLOBIN GLYCOSYLATED A1C: CPT | Performed by: STUDENT IN AN ORGANIZED HEALTH CARE EDUCATION/TRAINING PROGRAM

## 2025-02-25 PROCEDURE — 80053 COMPREHEN METABOLIC PANEL: CPT | Performed by: PHYSICIAN ASSISTANT

## 2025-02-25 PROCEDURE — 70551 MRI BRAIN STEM W/O DYE: CPT

## 2025-02-25 PROCEDURE — 93005 ELECTROCARDIOGRAM TRACING: CPT | Performed by: PHYSICIAN ASSISTANT

## 2025-02-25 PROCEDURE — 85610 PROTHROMBIN TIME: CPT | Performed by: PHYSICIAN ASSISTANT

## 2025-02-25 PROCEDURE — 99222 1ST HOSP IP/OBS MODERATE 55: CPT | Performed by: STUDENT IN AN ORGANIZED HEALTH CARE EDUCATION/TRAINING PROGRAM

## 2025-02-25 PROCEDURE — 71045 X-RAY EXAM CHEST 1 VIEW: CPT

## 2025-02-25 PROCEDURE — 80061 LIPID PANEL: CPT | Performed by: STUDENT IN AN ORGANIZED HEALTH CARE EDUCATION/TRAINING PROGRAM

## 2025-02-25 PROCEDURE — 70498 CT ANGIOGRAPHY NECK: CPT

## 2025-02-25 PROCEDURE — 25510000001 IOPAMIDOL PER 1 ML: Performed by: EMERGENCY MEDICINE

## 2025-02-25 PROCEDURE — 82565 ASSAY OF CREATININE: CPT

## 2025-02-25 PROCEDURE — 99285 EMERGENCY DEPT VISIT HI MDM: CPT

## 2025-02-25 PROCEDURE — 70496 CT ANGIOGRAPHY HEAD: CPT

## 2025-02-25 PROCEDURE — 84484 ASSAY OF TROPONIN QUANT: CPT | Performed by: PHYSICIAN ASSISTANT

## 2025-02-25 PROCEDURE — 70450 CT HEAD/BRAIN W/O DYE: CPT

## 2025-02-25 PROCEDURE — 85025 COMPLETE CBC W/AUTO DIFF WBC: CPT | Performed by: PHYSICIAN ASSISTANT

## 2025-02-25 PROCEDURE — 84443 ASSAY THYROID STIM HORMONE: CPT | Performed by: PHYSICIAN ASSISTANT

## 2025-02-25 RX ORDER — CITALOPRAM HYDROBROMIDE 20 MG/1
10 TABLET ORAL DAILY
Status: DISCONTINUED | OUTPATIENT
Start: 2025-02-26 | End: 2025-03-05 | Stop reason: HOSPADM

## 2025-02-25 RX ORDER — ROSUVASTATIN CALCIUM 20 MG/1
20 TABLET, COATED ORAL NIGHTLY
Status: DISCONTINUED | OUTPATIENT
Start: 2025-02-25 | End: 2025-03-05 | Stop reason: HOSPADM

## 2025-02-25 RX ORDER — TAMSULOSIN HYDROCHLORIDE 0.4 MG/1
0.4 CAPSULE ORAL NIGHTLY
Status: DISCONTINUED | OUTPATIENT
Start: 2025-02-25 | End: 2025-03-05 | Stop reason: HOSPADM

## 2025-02-25 RX ORDER — LEVOTHYROXINE SODIUM 50 UG/1
50 TABLET ORAL
Status: DISCONTINUED | OUTPATIENT
Start: 2025-02-26 | End: 2025-03-05 | Stop reason: HOSPADM

## 2025-02-25 RX ORDER — AMLODIPINE BESYLATE 10 MG/1
10 TABLET ORAL
Status: DISCONTINUED | OUTPATIENT
Start: 2025-02-26 | End: 2025-03-01

## 2025-02-25 RX ORDER — ASPIRIN 81 MG/1
81 TABLET, CHEWABLE ORAL DAILY
Status: DISCONTINUED | OUTPATIENT
Start: 2025-02-26 | End: 2025-03-05 | Stop reason: HOSPADM

## 2025-02-25 RX ORDER — CHOLECALCIFEROL (VITAMIN D3) 25 MCG
1000 TABLET ORAL DAILY
Status: DISCONTINUED | OUTPATIENT
Start: 2025-02-26 | End: 2025-03-05 | Stop reason: HOSPADM

## 2025-02-25 RX ORDER — IOPAMIDOL 755 MG/ML
100 INJECTION, SOLUTION INTRAVASCULAR
Status: COMPLETED | OUTPATIENT
Start: 2025-02-25 | End: 2025-02-25

## 2025-02-25 RX ORDER — DONEPEZIL HYDROCHLORIDE 10 MG/1
5 TABLET, FILM COATED ORAL NIGHTLY
Status: DISCONTINUED | OUTPATIENT
Start: 2025-02-25 | End: 2025-03-05 | Stop reason: HOSPADM

## 2025-02-25 RX ORDER — RANOLAZINE 500 MG/1
500 TABLET, EXTENDED RELEASE ORAL EVERY 12 HOURS SCHEDULED
Status: DISCONTINUED | OUTPATIENT
Start: 2025-02-25 | End: 2025-03-05 | Stop reason: HOSPADM

## 2025-02-25 RX ORDER — DEMECLOCYCLINE HYDROCHLORIDE 150 MG/1
150 TABLET, FILM COATED ORAL EVERY 12 HOURS SCHEDULED
Status: COMPLETED | OUTPATIENT
Start: 2025-02-25 | End: 2025-03-04

## 2025-02-25 RX ORDER — CARVEDILOL 6.25 MG/1
6.25 TABLET ORAL 2 TIMES DAILY WITH MEALS
Status: DISCONTINUED | OUTPATIENT
Start: 2025-02-26 | End: 2025-03-01

## 2025-02-25 RX ORDER — SODIUM CHLORIDE 0.9 % (FLUSH) 0.9 %
10 SYRINGE (ML) INJECTION AS NEEDED
Status: DISCONTINUED | OUTPATIENT
Start: 2025-02-25 | End: 2025-03-05 | Stop reason: HOSPADM

## 2025-02-25 RX ORDER — PANTOPRAZOLE SODIUM 40 MG/1
40 TABLET, DELAYED RELEASE ORAL
Status: DISCONTINUED | OUTPATIENT
Start: 2025-02-26 | End: 2025-03-05 | Stop reason: HOSPADM

## 2025-02-25 RX ADMIN — IOPAMIDOL 80 ML: 755 INJECTION, SOLUTION INTRAVENOUS at 21:35

## 2025-02-25 NOTE — Clinical Note
Stent deployed in the  right subclavian artery. Pressure = 13 florencio. Inflation time = 10 seconds.

## 2025-02-26 LAB
ANION GAP SERPL CALCULATED.3IONS-SCNC: 10 MMOL/L (ref 5–15)
BUN SERPL-MCNC: 13 MG/DL (ref 8–23)
BUN/CREAT SERPL: 16 (ref 7–25)
CALCIUM SPEC-SCNC: 8.9 MG/DL (ref 8.6–10.5)
CHLORIDE SERPL-SCNC: 104 MMOL/L (ref 98–107)
CO2 SERPL-SCNC: 26 MMOL/L (ref 22–29)
CREAT SERPL-MCNC: 0.81 MG/DL (ref 0.57–1)
EGFRCR SERPLBLD CKD-EPI 2021: 70.8 ML/MIN/1.73
GEN 5 1HR TROPONIN T REFLEX: 13 NG/L
GLUCOSE SERPL-MCNC: 86 MG/DL (ref 65–99)
MAGNESIUM SERPL-MCNC: 1.9 MG/DL (ref 1.6–2.4)
POTASSIUM SERPL-SCNC: 4.1 MMOL/L (ref 3.5–5.2)
SODIUM SERPL-SCNC: 140 MMOL/L (ref 136–145)
TROPONIN T % DELTA: -24
TROPONIN T NUMERIC DELTA: -4 NG/L

## 2025-02-26 PROCEDURE — 83735 ASSAY OF MAGNESIUM: CPT | Performed by: STUDENT IN AN ORGANIZED HEALTH CARE EDUCATION/TRAINING PROGRAM

## 2025-02-26 PROCEDURE — 80048 BASIC METABOLIC PNL TOTAL CA: CPT | Performed by: STUDENT IN AN ORGANIZED HEALTH CARE EDUCATION/TRAINING PROGRAM

## 2025-02-26 PROCEDURE — 25010000002 HEPARIN (PORCINE) PER 1000 UNITS: Performed by: STUDENT IN AN ORGANIZED HEALTH CARE EDUCATION/TRAINING PROGRAM

## 2025-02-26 PROCEDURE — 84484 ASSAY OF TROPONIN QUANT: CPT | Performed by: PHYSICIAN ASSISTANT

## 2025-02-26 PROCEDURE — 25010000002 MAGNESIUM SULFATE 4 GM/100ML SOLUTION: Performed by: INTERNAL MEDICINE

## 2025-02-26 PROCEDURE — 99232 SBSQ HOSP IP/OBS MODERATE 35: CPT | Performed by: INTERNAL MEDICINE

## 2025-02-26 RX ORDER — TAMSULOSIN HYDROCHLORIDE 0.4 MG/1
1 CAPSULE ORAL DAILY
COMMUNITY

## 2025-02-26 RX ORDER — PANTOPRAZOLE SODIUM 40 MG/1
40 TABLET, DELAYED RELEASE ORAL DAILY
COMMUNITY
End: 2025-03-05 | Stop reason: HOSPADM

## 2025-02-26 RX ORDER — BISACODYL 5 MG/1
5 TABLET, DELAYED RELEASE ORAL DAILY PRN
Status: DISCONTINUED | OUTPATIENT
Start: 2025-02-26 | End: 2025-03-05 | Stop reason: HOSPADM

## 2025-02-26 RX ORDER — ACETAMINOPHEN 650 MG/1
650 SUPPOSITORY RECTAL EVERY 4 HOURS PRN
Status: DISCONTINUED | OUTPATIENT
Start: 2025-02-26 | End: 2025-03-05 | Stop reason: HOSPADM

## 2025-02-26 RX ORDER — SODIUM CHLORIDE 9 MG/ML
40 INJECTION, SOLUTION INTRAVENOUS AS NEEDED
Status: DISCONTINUED | OUTPATIENT
Start: 2025-02-26 | End: 2025-03-05 | Stop reason: HOSPADM

## 2025-02-26 RX ORDER — ESOMEPRAZOLE MAGNESIUM 40 MG/1
40 CAPSULE, DELAYED RELEASE ORAL 2 TIMES DAILY
COMMUNITY

## 2025-02-26 RX ORDER — SODIUM CHLORIDE 0.9 % (FLUSH) 0.9 %
10 SYRINGE (ML) INJECTION AS NEEDED
Status: DISCONTINUED | OUTPATIENT
Start: 2025-02-26 | End: 2025-03-05 | Stop reason: HOSPADM

## 2025-02-26 RX ORDER — MAGNESIUM SULFATE HEPTAHYDRATE 40 MG/ML
4 INJECTION, SOLUTION INTRAVENOUS ONCE
Status: COMPLETED | OUTPATIENT
Start: 2025-02-26 | End: 2025-02-26

## 2025-02-26 RX ORDER — POLYETHYLENE GLYCOL 3350 17 G/17G
17 POWDER, FOR SOLUTION ORAL DAILY PRN
Status: DISCONTINUED | OUTPATIENT
Start: 2025-02-26 | End: 2025-03-05 | Stop reason: HOSPADM

## 2025-02-26 RX ORDER — SODIUM CHLORIDE 0.9 % (FLUSH) 0.9 %
10 SYRINGE (ML) INJECTION EVERY 12 HOURS SCHEDULED
Status: DISCONTINUED | OUTPATIENT
Start: 2025-02-26 | End: 2025-03-05 | Stop reason: HOSPADM

## 2025-02-26 RX ORDER — FUROSEMIDE 20 MG/1
20 TABLET ORAL DAILY
COMMUNITY

## 2025-02-26 RX ORDER — AMOXICILLIN 250 MG
2 CAPSULE ORAL 2 TIMES DAILY PRN
Status: DISCONTINUED | OUTPATIENT
Start: 2025-02-26 | End: 2025-03-05 | Stop reason: HOSPADM

## 2025-02-26 RX ORDER — AMLODIPINE BESYLATE 10 MG/1
10 TABLET ORAL DAILY
COMMUNITY

## 2025-02-26 RX ORDER — BISACODYL 10 MG
10 SUPPOSITORY, RECTAL RECTAL DAILY PRN
Status: DISCONTINUED | OUTPATIENT
Start: 2025-02-26 | End: 2025-03-05 | Stop reason: HOSPADM

## 2025-02-26 RX ORDER — ASPIRIN 81 MG/1
81 TABLET, CHEWABLE ORAL DAILY
COMMUNITY

## 2025-02-26 RX ORDER — CARVEDILOL 6.25 MG/1
6.25 TABLET ORAL 2 TIMES DAILY
Status: ON HOLD | COMMUNITY
End: 2025-03-05

## 2025-02-26 RX ORDER — DONEPEZIL HYDROCHLORIDE 5 MG/1
5 TABLET, FILM COATED ORAL NIGHTLY
COMMUNITY

## 2025-02-26 RX ORDER — HEPARIN SODIUM 5000 [USP'U]/ML
5000 INJECTION, SOLUTION INTRAVENOUS; SUBCUTANEOUS EVERY 8 HOURS SCHEDULED
Status: DISCONTINUED | OUTPATIENT
Start: 2025-02-26 | End: 2025-03-05 | Stop reason: HOSPADM

## 2025-02-26 RX ORDER — RANOLAZINE 500 MG/1
500 TABLET, EXTENDED RELEASE ORAL EVERY 12 HOURS
COMMUNITY

## 2025-02-26 RX ORDER — CETIRIZINE HYDROCHLORIDE 10 MG/1
10 TABLET ORAL NIGHTLY
COMMUNITY

## 2025-02-26 RX ORDER — ACETAMINOPHEN 160 MG/5ML
650 SOLUTION ORAL EVERY 4 HOURS PRN
Status: DISCONTINUED | OUTPATIENT
Start: 2025-02-26 | End: 2025-03-05 | Stop reason: HOSPADM

## 2025-02-26 RX ORDER — ROSUVASTATIN CALCIUM 20 MG/1
20 TABLET, COATED ORAL DAILY
COMMUNITY

## 2025-02-26 RX ORDER — LEVOTHYROXINE SODIUM 50 UG/1
50 TABLET ORAL
COMMUNITY

## 2025-02-26 RX ORDER — ACETAMINOPHEN 325 MG/1
650 TABLET ORAL EVERY 4 HOURS PRN
Status: DISCONTINUED | OUTPATIENT
Start: 2025-02-26 | End: 2025-03-05 | Stop reason: HOSPADM

## 2025-02-26 RX ORDER — DEMECLOCYCLINE HYDROCHLORIDE 150 MG/1
150 TABLET, FILM COATED ORAL 2 TIMES DAILY
COMMUNITY

## 2025-02-26 RX ADMIN — RANOLAZINE 500 MG: 500 TABLET, FILM COATED, EXTENDED RELEASE ORAL at 00:36

## 2025-02-26 RX ADMIN — RANOLAZINE 500 MG: 500 TABLET, FILM COATED, EXTENDED RELEASE ORAL at 20:51

## 2025-02-26 RX ADMIN — DEMECLOCYCLINE HYDROCHLORIDE 150 MG: 150 TABLET, FILM COATED ORAL at 09:39

## 2025-02-26 RX ADMIN — Medication 10 ML: at 01:26

## 2025-02-26 RX ADMIN — DONEPEZIL HYDROCHLORIDE 5 MG: 5 TABLET ORAL at 20:51

## 2025-02-26 RX ADMIN — PANTOPRAZOLE SODIUM 40 MG: 40 TABLET, DELAYED RELEASE ORAL at 06:32

## 2025-02-26 RX ADMIN — HEPARIN SODIUM 5000 UNITS: 5000 INJECTION INTRAVENOUS; SUBCUTANEOUS at 14:18

## 2025-02-26 RX ADMIN — DEMECLOCYCLINE HYDROCHLORIDE 150 MG: 150 TABLET, FILM COATED ORAL at 02:08

## 2025-02-26 RX ADMIN — MAGNESIUM SULFATE IN WATER FOR 4 G: 40 INJECTION INTRAVENOUS at 12:42

## 2025-02-26 RX ADMIN — DEMECLOCYCLINE HYDROCHLORIDE 150 MG: 150 TABLET, FILM COATED ORAL at 20:51

## 2025-02-26 RX ADMIN — ROSUVASTATIN 20 MG: 20 TABLET, FILM COATED ORAL at 00:36

## 2025-02-26 RX ADMIN — HEPARIN SODIUM 5000 UNITS: 5000 INJECTION INTRAVENOUS; SUBCUTANEOUS at 20:51

## 2025-02-26 RX ADMIN — PANTOPRAZOLE SODIUM 40 MG: 40 TABLET, DELAYED RELEASE ORAL at 17:00

## 2025-02-26 RX ADMIN — Medication 1000 UNITS: at 08:49

## 2025-02-26 RX ADMIN — HEPARIN SODIUM 5000 UNITS: 5000 INJECTION INTRAVENOUS; SUBCUTANEOUS at 06:32

## 2025-02-26 RX ADMIN — LEVOTHYROXINE SODIUM 50 MCG: 0.05 TABLET ORAL at 06:33

## 2025-02-26 RX ADMIN — DONEPEZIL HYDROCHLORIDE 5 MG: 5 TABLET ORAL at 00:36

## 2025-02-26 RX ADMIN — ASPIRIN 81 MG CHEWABLE TABLET 81 MG: 81 TABLET CHEWABLE at 08:49

## 2025-02-26 RX ADMIN — ROSUVASTATIN 20 MG: 20 TABLET, FILM COATED ORAL at 20:51

## 2025-02-26 RX ADMIN — Medication 10 ML: at 20:51

## 2025-02-26 RX ADMIN — RANOLAZINE 500 MG: 500 TABLET, FILM COATED, EXTENDED RELEASE ORAL at 08:49

## 2025-02-26 RX ADMIN — CITALOPRAM HYDROBROMIDE 10 MG: 20 TABLET ORAL at 08:49

## 2025-02-26 NOTE — ED NOTES
Jolanta Coppola    Nursing Report ED to Floor:  Mental status: A&O x4  Ambulatory status: Non Ambulatory in ED  Oxygen Therapy:  RA  Cardiac Rhythm: NSR  Admitted from: ED/Home  Safety Concerns:  NA  Precautions: NA  Social Issues: NA  ED Room #:  31    ED Nurse Phone Extension - [phone] or may call [phone].      HPI:   Chief Complaint   Patient presents with    Abnormal Imaging       Past Medical History:  History reviewed. No pertinent past medical history.     Past Surgical History:  History reviewed. No pertinent surgical history.     Admitting Doctor:   Yosef Markham MD    Consulting Provider(s):  Consults       No orders found from 1/27/2025 to 2/26/2025.             Admitting Diagnosis:     Most Recent Vitals:   Vitals:    02/25/25 2038 02/25/25 2059 02/25/25 2200 02/25/25 2230   BP:  138/69 126/59 128/60   BP Location:       Patient Position:       Pulse: 77 61 60 62   Resp:       Temp:       TempSrc:       SpO2: 97% 100% 100% 99%   Weight:       Height:           Active LDAs/IV Access:   Lines, Drains & Airways       Active LDAs       Name Placement date Placement time Site Days    Peripheral IV 02/25/25 2044 Right Antecubital 02/25/25 2044  Antecubital  less than 1                    Labs (abnormal labs have a star):   Labs Reviewed   COMPREHENSIVE METABOLIC PANEL - Abnormal; Notable for the following components:       Result Value    Creatinine 1.13 (*)     eGFR 47.5 (*)     All other components within normal limits    Narrative:     GFR Categories in Chronic Kidney Disease (CKD)      GFR Category          GFR (mL/min/1.73)    Interpretation  G1                     90 or greater         Normal or high (1)  G2                      60-89                Mild decrease (1)  G3a                   45-59                Mild to moderate decrease  G3b                   30-44                Moderate to severe decrease  G4                    15-29                Severe decrease  G5                    14 or less            Kidney failure          (1)In the absence of evidence of kidney disease, neither GFR category G1 or G2 fulfill the criteria for CKD.    eGFR calculation 2021 CKD-EPI creatinine equation, which does not include race as a factor   TROPONIN - Abnormal; Notable for the following components:    HS Troponin T 17 (*)     All other components within normal limits    Narrative:     High Sensitive Troponin T Reference Range:  <14.0 ng/L- Negative Female for AMI  <22.0 ng/L- Negative Male for AMI  >=14 - Abnormal Female indicating possible myocardial injury.  >=22 - Abnormal Male indicating possible myocardial injury.   Clinicians would have to utilize clinical acumen, EKG, Troponin, and serial changes to determine if it is an Acute Myocardial Infarction or myocardial injury due to an underlying chronic condition.        PROTIME-INR - Normal   CBC WITH AUTO DIFFERENTIAL - Normal   TSH - Normal   POCT CREATININE - Normal   HIGH SENSITIVITIY TROPONIN T 1HR   CBC AND DIFFERENTIAL    Narrative:     The following orders were created for panel order CBC & Differential.  Procedure                               Abnormality         Status                     ---------                               -----------         ------                     CBC Auto Differential[[id number]]        Normal              Final result                 Please view results for these tests on the individual orders.       Meds Given in ED:   Medications   sodium chloride 0.9 % flush 10 mL (has no administration in time range)   iopamidol (ISOVUE-370) 76 % injection 100 mL (80 mL Intravenous Given 2/25/25 2135)           Last NIH score:                                                          Dysphagia screening results:  Patient Factors Component (Dysphagia:Stroke or Rule-out)  Best Eye Response: 4-->(E4) spontaneous (02/25/25 2037)  Best Motor Response: 6-->(M6) obeys commands (02/25/25 2037)  Best Verbal Response: 5-->(V5) oriented (02/25/25 2037)  Vasyl  Coma Scale Score: 15 (02/25/25 2037)     [place] Coma Scale:  No data recorded     CIWA:        Restraint Type:            Isolation Status:  No active isolations

## 2025-02-26 NOTE — CASE MANAGEMENT/SOCIAL WORK
Discharge Planning Assessment  [facility]     Patient Name: Jolanta Coppola  MRN: [id number]  Today's Date: 2/26/2025    Admit Date: 2/25/2025    Plan: home   Discharge Needs Assessment       Row Name 02/26/25 0932       Living Environment    People in Home alone    Current Living Arrangements home    Potentially Unsafe Housing Conditions none    In the past 12 months has the electric, gas, oil, or water company threatened to shut off services in your home? No    Primary Care Provided by self    Provides Primary Care For no one    Family Caregiver if Needed child(marshall), adult    Quality of Family Relationships involved;helpful    Able to Return to Prior Arrangements yes       Resource/Environmental Concerns    Resource/Environmental Concerns none    Transportation Concerns none       Transportation Needs    In the past 12 months, has lack of transportation kept you from medical appointments or from getting medications? no    In the past 12 months, has lack of transportation kept you from meetings, work, or from getting things needed for daily living? No       Food Insecurity    Within the past 12 months, you worried that your food would run out before you got the money to buy more. Never true    Within the past 12 months, the food you bought just didn't last and you didn't have money to get more. Never true       Transition Planning    Patient/Family Anticipates Transition to home with family    Patient/Family Anticipated Services at Transition none    Transportation Anticipated family or friend will provide       Discharge Needs Assessment    Readmission Within the Last 30 Days no previous admission in last 30 days    Equipment Currently Used at Home cane, straight    Concerns to be Addressed discharge planning    Do you want help finding or keeping work or a job? I do not need or want help    Do you want help with school or training? For example, starting or completing job training or getting a high school  diploma, GED or equivalent No    Equipment Needed After Discharge none                   Discharge Plan       Row Name 02/26/25 0933       Plan    Plan home    Patient/Family in Agreement with Plan yes    Plan Comments Pt lives alone in [facility]. She reports she was independent with ADLs and mobility prior to admit. She owns a straight cane. Pt is followed by her PCP and has drug coverage. At this time her plan for discharge is to return home. CM will follow for any dc needs    Final Discharge Disposition Code 01 - home or self-care                  Continued Care and Services - Admitted Since 2/25/2025    No active coordination exists for this encounter.          Demographic Summary       Row Name 02/26/25 0931       General Information    Admission Type inpatient    Referral Source physician    Reason for Consult discharge planning    Preferred Language English    General Information Comments PCP Benita Bruce       Contact Information    Permission Granted to Share Info With family/designee    Contact Information Comments [place] Andrea [phone]                   Functional Status       Row Name 02/26/25 0931       Functional Status    Usual Activity Tolerance good    Current Activity Tolerance good       Physical Activity    On average, how many days per week do you engage in moderate to strenuous exercise (like a brisk walk)? 0 days    On average, how many minutes do you engage in exercise at this level? 0 min    Number of minutes of exercise per week 0       Functional Status, IADL    Medications independent    Meal Preparation independent    Housekeeping independent    Laundry independent    Shopping independent    If for any reason you need help with day-to-day activities such as bathing, preparing meals, shopping, managing finances, etc., do you get the help you need? I don't need any help       Mental Status    General Appearance WDL WDL       Mental Status Summary    Recent Changes in Mental  Status/Cognitive Functioning no changes       Employment/    Employment Status retired    Current or Previous Occupation not applicable                   Psychosocial    No documentation.                  Abuse/Neglect    No documentation.                  Legal    No documentation.                  Substance Abuse    No documentation.                  Patient Forms    No documentation.                     Brea Orona RN

## 2025-02-26 NOTE — CONSULTS
Vascular Surgery Consult Note    Inpatient Vascular Surgery Consult  Consult performed by: Dalia March PA-C  Consult ordered by: Yosef Markham MD  Reason for consult: subclavian stenosis with vertebral steal         HPI: This is a 84-year-old female with a history of coronary artery disease, multiple TIAs, carotid stenosis, and progressive subclavian steal syndrome, presenting with worsening lightheadedness and near syncope with exertion and positional changes, significantly impacting her quality of life.  She underwent right subclavian artery revascularization in March 2024, requiring lithotripsy after initial stent failure.  This procedure was performed at [facility].  Despite intervention, symptoms have progressed.  Recent carotid duplex performed on February 5 showed persistent retrograde right vertebral artery flow, 90% right subclavian artery stenosis, and bilateral internal carotid artery stenosis.  She denies any new neurological deficits, chest pain, or dyspnea.  Given her symptom progression and imaging findings, she was recommended vascular surgery consultation for second opinion and possible intervention.      Review of Systems   Constitutional:  Positive for fatigue.   Neurological:  Positive for dizziness, syncope (2 episodes recently), weakness and light-headedness.   All other systems reviewed and are negative.         History  Past Medical History:   Diagnosis Date    Disease of thyroid gland     GERD (gastroesophageal reflux disease)     Hypertension     Sleep apnea     Stroke      Past Surgical History:   Procedure Laterality Date    ABDOMINAL SURGERY      CORONARY ARTERY BYPASS GRAFT  2010    JOINT REPLACEMENT Bilateral      History reviewed. No pertinent family history.  Social History     Tobacco Use    Smoking status: Former     Types: Cigarettes    Smokeless tobacco: Never   Vaping Use    Vaping status: Never Used   Substance Use Topics    Alcohol use: Yes      Alcohol/week: 4.0 standard drinks of alcohol     Types: 4 Glasses of wine per week    Drug use: Never     Medications Prior to Admission   Medication Sig Dispense Refill Last Dose/Taking    amLODIPine (NORVASC) 10 MG tablet Take 1 tablet by mouth Daily.       aspirin 81 MG chewable tablet Chew 1 tablet Daily. Per d/c med rec       carvedilol (COREG) 6.25 MG tablet Take 1 tablet by mouth 2 (Two) Times a Day.       cetirizine (zyrTEC) 10 MG tablet Take 1 tablet by mouth Every Night. Per d/c med rec       cholecalciferol (VITAMIN D3) 1.25 MG (74706 UT) capsule Take 2,000 Units by mouth Daily. Per discharge med rec       demeclocycline (DECLOMYCIN) 150 MG tablet Take 1 tablet by mouth 2 (Two) Times a Day.       donepezil (ARICEPT) 5 MG tablet Take 1 tablet by mouth Every Night. Per d/c med rec       esomeprazole (nexIUM) 40 MG capsule Take 1 capsule by mouth 2 (Two) Times a Day.       furosemide (LASIX) 20 MG tablet Take 1 tablet by mouth Daily.       levothyroxine (SYNTHROID, LEVOTHROID) 50 MCG tablet Take 1 tablet by mouth Every Morning. Per d/c med rec       pantoprazole (PROTONIX) 40 MG EC tablet Take 1 tablet by mouth Daily.       ranolazine (RANEXA) 500 MG 12 hr tablet Take 1 tablet by mouth Every 12 (Twelve) Hours. Per d/c med rec       rosuvastatin (CRESTOR) 20 MG tablet Take 1 tablet by mouth Daily.       tamsulosin (FLOMAX) 0.4 MG capsule 24 hr capsule Take 1 capsule by mouth Daily.        Allergies:  Scopolamine, Valsartan, Meclizine, and Red blood cells    Vital Signs  Temp:  [97.8 °F (36.6 °C)-98.3 °F (36.8 °C)] 97.8 °F (36.6 °C)  Heart Rate:  [56-87] 77  Resp:  [18] 18  BP: ()/(57-87) 94/57        Physical Exam  Constitutional:       Appearance: Normal appearance.   HENT:      Head: Normocephalic.   Cardiovascular:      Rate and Rhythm: Normal rate and regular rhythm.   Pulmonary:      Effort: Pulmonary effort is normal.   Abdominal:      Palpations: Abdomen is soft.   Musculoskeletal:          General: Normal range of motion.      Right upper arm: Normal.      Left upper arm: Normal.      Right wrist: Normal pulse.      Left wrist: Normal pulse.   Skin:     General: Skin is warm and dry.   Neurological:      General: No focal deficit present.      Mental Status: She is alert and oriented to person, place, and time.   Psychiatric:         Mood and Affect: Mood normal.              Results Review:    I reviewed the patient's new clinical results.  CBC    Results from last 7 days   Lab Units 02/25/25  2044   WBC 10*3/mm3 6.51   HEMOGLOBIN g/dL 12.3   PLATELETS 10*3/mm3 282     BMP   Results from last 7 days   Lab Units 02/26/25  0751 02/25/25  2111 02/25/25  2044   SODIUM mmol/L 140  --  139   POTASSIUM mmol/L 4.1  --  4.6   CHLORIDE mmol/L 104  --  103   CO2 mmol/L 26.0  --  25.0   BUN mg/dL 13  --  18   CREATININE mg/dL 0.81 1.20 1.13*   GLUCOSE mg/dL 86  --  99   MAGNESIUM mg/dL 1.9  --   --         Assessment and Plan:    RIGHT subclavian artery stenosis  - Reviewed CTA, have disk for Dr. Davis to review images  - Continue aspirin and statin  - PT/OT as tolerated  - Case discussed with Dr. Davis who will review CTA images and make final recommendations for intervention        I discussed the patients findings and my recommendations with patient and family.       Dalia March PA-C  02/26/25  09:46 EST

## 2025-02-26 NOTE — PROGRESS NOTES
[facility] Medicine Services  PROGRESS NOTE    Patient Name: Jolanta Coppola  : 1940  MRN: [id number]    Date of Admission: 2025  Primary Care Physician: Benita Bruce APRN    Subjective   Subjective     CC: f/u syncope    HPI: Up in bed with family in room. Doing well. No complaints at this time.      Objective   Objective     Vital Signs:   Temp:  [97.8 °F (36.6 °C)-98.3 °F (36.8 °C)] 97.8 °F (36.6 °C)  Heart Rate:  [56-87] 64  Resp:  [18] 18  BP: ()/(57-87) 148/74     Physical Exam:  Constitutional: No acute distress, awake, alert, elderly chronically ill appearing  HENT: NCAT, mucous membranes moist  Respiratory: Clear to auscultation bilaterally, respiratory effort normal   Cardiovascular: RRR, no murmurs, rubs, or gallops  Gastrointestinal: Positive bowel sounds, soft, nontender, nondistended  Musculoskeletal: No bilateral ankle edema  Psychiatric: Appropriate affect, cooperative  Neurologic: Oriented x 3, strength symmetric in all extremities, Cranial Nerves grossly intact to confrontation, speech clear  Skin: No rashes     Results Reviewed:  LAB RESULTS:      Lab 25  0024 25   WBC  --   --  6.51   HEMOGLOBIN  --   --  12.3   HEMATOCRIT  --   --  37.9   PLATELETS  --   --  282   NEUTROS ABS  --   --  3.43   IMMATURE GRANS (ABS)  --   --  0.03   LYMPHS ABS  --   --  2.13   MONOS ABS  --   --  0.65   EOS ABS  --   --  0.20   MCV  --   --  87.9   PROTIME  --   --  13.2   HSTROP T 13 17*  --          Lab 25  0751 25   SODIUM 140  --  139   POTASSIUM 4.1  --  4.6   CHLORIDE 104  --  103   CO2 26.0  --  25.0   ANION GAP 10.0  --  11.0   BUN 13  --  18   CREATININE 0.81 1.20 1.13*   EGFR 70.8  --  47.5*   GLUCOSE 86  --  99   CALCIUM 8.9  --  8.9   MAGNESIUM 1.9  --   --    HEMOGLOBIN A1C  --   --  5.50   TSH  --   --  3.020         Lab 25   TOTAL PROTEIN 6.6   ALBUMIN 3.8   GLOBULIN 2.8    ALT (SGPT) 13   AST (SGOT) 26   BILIRUBIN 0.2   ALK PHOS 58         Lab 02/26/25  0024 02/25/25 2143 02/25/25 2044   HSTROP T 13 17*  --    PROTIME  --   --  13.2   INR  --   --  0.99         Lab 02/25/25 2143   CHOLESTEROL 124   LDL CHOL 45   HDL CHOL 58   TRIGLYCERIDES 118             Brief Urine Lab Results       None            Microbiology Results Abnormal       None            MRI Brain Without Contrast    Result Date: 2/26/2025  MRI BRAIN WO CONTRAST Date of Exam: 2/25/2025 11:58 PM EST Indication: dizziness, near syncope, 90% R SCA stenosis.  Comparison: CT head without 2/25/2025. Technique:  Routine multiplanar/multisequence sequence images of the brain were obtained without contrast administration. Findings: There is no diffusion restriction to suggest acute infarct. There is no evidence of acute or chronic intracranial hemorrhage. No mass effect or midline shift. No abnormal extra-axial collections. There is mild to moderate generalized atrophy. Mild subcortical and periventricular T2/FLAIR white matter hyperintensities most likely reflect the sequela of chronic microvascular ischemic disease. Calvarial and superficial soft tissue signal is within normal limits. Orbits appear unremarkable. The paranasal sinuses and the mastoid air cells appear well aerated.     Impression: Impression: 1. No acute intracranial process. 2. Age-related atrophy and chronic microvascular ischemic changes. Electronically Signed: Thalia Zamora MD  2/26/2025 6:54 AM EST  Workstation ID: [id number]    XR Chest 1 View    Result Date: 2/25/2025  XR CHEST 1 VW Date of Exam: 2/25/2025 9:13 PM EST Indication: near syncope Comparison: Chest radiograph 1/30/2007 Findings: Mediastinum: Cardiac silhouette appears unchanged including postoperative changes Lungs: The lungs appear clear without focal consolidation appreciated. Pleura: No pleural effusion or pneumothorax. Bones and soft tissues: No acute, displaced fracture seen. Median  sternotomy.     Impression: Impression: No radiographic evidence of acute cardiopulmonary abnormality. Electronically Signed: Dima Good  2/25/2025 10:02 PM EST  Workstation ID: [id number]    CT Head Without Contrast    Result Date: 2/25/2025  CT HEAD WO CONTRAST, CT ANGIOGRAM NECK, CT ANGIOGRAM HEAD W AI ANALYSIS OF LVO Date of Exam: 2/25/2025 9:22 PM EST Indication: dizziness, near syncope. Comparison: None available. Technique: Axial CT images were obtained of the head without contrast administration.  Automated exposure control and iterative construction methods were used. CTA of the head and neck was performed after the uneventful intravenous administration of 80 mL Isovue-370. Reconstructed coronal and sagittal images were also obtained. In addition, a 3-D volume rendered image was created for interpretation. Automated exposure control and iterative reconstruction methods were used.  Findings: Noncontrast head: No acute intracranial hemorrhage.Intact appearing gray-white differentiation.No extra-axial fluid collection.No significant mass effect. No hydrocephalus. Mild generalized parenchymal volume loss. Scattered areas of periventricular and subcortical white matter hypoattenuation, nonspecific, perhaps from small vessel ischemic/hypertensive changes in a patient of this age.There are intracranial atherosclerotic calcifications. Mild scattered mucosal thickening in the paranasal sinuses.Mastoid air cells are essentially clear.. Bilateral lens replacements. No acute or aggressive appearing bony or extracranial soft tissue process. HEAD CTA: Anterior circulation: No proximal large vessel occlusion or major stenosis. Posterior circulation: No proximal large vessel occlusion or major stenosis. Major dural venous sinuses: No evidence of dural venous sinus thrombosis within the limitation of angiographic contrast imaging. NECK CTA: Normal variant common origin of the right brachiocephalic and left common carotid  arteries. Mild multifocal narrowing in the common carotid arteries from their origins to the bifurcation. Irregularity and atherosclerotic calcification at the carotid bulbs. There is less than 50% narrowing of the right ICA by NASCET criteria at its origin. There is moderate stenosis of the distal cervical right ICA. There is less than 50% narrowing of the left ICA by NASCET criteria at its origin. Otherwise, normal contrast enhancement of the internal carotid arteries from their origins to the proximal intradural portions.Irregularity and atherosclerotic calcification of the petrous and cavernous carotid arteries. There is diminutive to absent opacification of the cervical right vertebral artery from the distal V1 segments to the mid to distal V2 segment. The cervical left vertebral artery appears patent. The left vertebral artery is dominant. There is mild stenosis at the proximal left subclavian artery. 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 Angiographic contrast timing precludes accurate assessement of jugular vein patency. SOFT TISSUE NECK: No exophytic lesion seen within the aerodigestive tract. No pathologic appearing lymph nodes by imaging criteria. The visualized glands appear unremarkable. No acute or aggressive appearing osseous or soft tissue process.Degenerative changes of the imaged spine. Emphysema. Biapical pleural-parenchymal scarring. Status post CABG     Impression: Impression: No acute intracranial findings on noncontrast head CT. Brain MRI would be more sensitive for the evaluation of acute ischemia in the setting of persistent focal neurologic deficits. No proximal large vessel occlusion or major stenosis of the intracranial vasculature. There is diminutive to absent opacification of the cervical right vertebral artery from the distal V1 segment to the mid to distal V2 segment. There is severe stenosis of the proximal right subclavian  artery. Findings can be seen with subclavian steal syndrome. Correlation with Doppler ultrasound is recommended; reported prior carotid ultrasound demonstrated no evidence of subclavian steal syndrome, though images aren't available for direct comparison. Moderate stenosis of the distal cervical right ICA. Critical Findings were verbally communicated with POLLY Gill by Dima Good MD on 2/25/2025 at 10:00 p.m. Electronically Signed: Dima Good  2/25/2025 10:00 PM EST  Workstation ID: [id number]    CT Angiogram Head w AI Analysis of LVO    Result Date: 2/25/2025  CT HEAD WO CONTRAST, CT ANGIOGRAM NECK, CT ANGIOGRAM HEAD W AI ANALYSIS OF LVO Date of Exam: 2/25/2025 9:22 PM EST Indication: dizziness, near syncope. Comparison: None available. Technique: Axial CT images were obtained of the head without contrast administration.  Automated exposure control and iterative construction methods were used. CTA of the head and neck was performed after the uneventful intravenous administration of 80 mL Isovue-370. Reconstructed coronal and sagittal images were also obtained. In addition, a 3-D volume rendered image was created for interpretation. Automated exposure control and iterative reconstruction methods were used.  Findings: Noncontrast head: No acute intracranial hemorrhage.Intact appearing gray-white differentiation.No extra-axial fluid collection.No significant mass effect. No hydrocephalus. Mild generalized parenchymal volume loss. Scattered areas of periventricular and subcortical white matter hypoattenuation, nonspecific, perhaps from small vessel ischemic/hypertensive changes in a patient of this age.There are intracranial atherosclerotic calcifications. Mild scattered mucosal thickening in the paranasal sinuses.Mastoid air cells are essentially clear.. Bilateral lens replacements. No acute or aggressive appearing bony or extracranial soft tissue process. HEAD CTA: Anterior circulation: No proximal large vessel  occlusion or major stenosis. Posterior circulation: No proximal large vessel occlusion or major stenosis. Major dural venous sinuses: No evidence of dural venous sinus thrombosis within the limitation of angiographic contrast imaging. NECK CTA: Normal variant common origin of the right brachiocephalic and left common carotid arteries. Mild multifocal narrowing in the common carotid arteries from their origins to the bifurcation. Irregularity and atherosclerotic calcification at the carotid bulbs. There is less than 50% narrowing of the right ICA by NASCET criteria at its origin. There is moderate stenosis of the distal cervical right ICA. There is less than 50% narrowing of the left ICA by NASCET criteria at its origin. Otherwise, normal contrast enhancement of the internal carotid arteries from their origins to the proximal intradural portions.Irregularity and atherosclerotic calcification of the petrous and cavernous carotid arteries. There is diminutive to absent opacification of the cervical right vertebral artery from the distal V1 segments to the mid to distal V2 segment. The cervical left vertebral artery appears patent. The left vertebral artery is dominant. There is mild stenosis at the proximal left subclavian artery. 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 Angiographic contrast timing precludes accurate assessement of jugular vein patency. SOFT TISSUE NECK: No exophytic lesion seen within the aerodigestive tract. No pathologic appearing lymph nodes by imaging criteria. The visualized glands appear unremarkable. No acute or aggressive appearing osseous or soft tissue process.Degenerative changes of the imaged spine. Emphysema. Biapical pleural-parenchymal scarring. Status post CABG     Impression: Impression: No acute intracranial findings on noncontrast head CT. Brain MRI would be more sensitive for the evaluation of acute ischemia in the setting of  persistent focal neurologic deficits. No proximal large vessel occlusion or major stenosis of the intracranial vasculature. There is diminutive to absent opacification of the cervical right vertebral artery from the distal V1 segment to the mid to distal V2 segment. There is severe stenosis of the proximal right subclavian artery. Findings can be seen with subclavian steal syndrome. Correlation with Doppler ultrasound is recommended; reported prior carotid ultrasound demonstrated no evidence of subclavian steal syndrome, though images aren't available for direct comparison. Moderate stenosis of the distal cervical right ICA. Critical Findings were verbally communicated with POLLY Gill by Dima Good MD on 2/25/2025 at 10:00 p.m. Electronically Signed: Dima Good  2/25/2025 10:00 PM EST  Workstation ID: [id number]    CT Angiogram Neck    Result Date: 2/25/2025  CT HEAD WO CONTRAST, CT ANGIOGRAM NECK, CT ANGIOGRAM HEAD W AI ANALYSIS OF LVO Date of Exam: 2/25/2025 9:22 PM EST Indication: dizziness, near syncope. Comparison: None available. Technique: Axial CT images were obtained of the head without contrast administration.  Automated exposure control and iterative construction methods were used. CTA of the head and neck was performed after the uneventful intravenous administration of 80 mL Isovue-370. Reconstructed coronal and sagittal images were also obtained. In addition, a 3-D volume rendered image was created for interpretation. Automated exposure control and iterative reconstruction methods were used.  Findings: Noncontrast head: No acute intracranial hemorrhage.Intact appearing gray-white differentiation.No extra-axial fluid collection.No significant mass effect. No hydrocephalus. Mild generalized parenchymal volume loss. Scattered areas of periventricular and subcortical white matter hypoattenuation, nonspecific, perhaps from small vessel ischemic/hypertensive changes in a patient of this age.There are  intracranial atherosclerotic calcifications. Mild scattered mucosal thickening in the paranasal sinuses.Mastoid air cells are essentially clear.. Bilateral lens replacements. No acute or aggressive appearing bony or extracranial soft tissue process. HEAD CTA: Anterior circulation: No proximal large vessel occlusion or major stenosis. Posterior circulation: No proximal large vessel occlusion or major stenosis. Major dural venous sinuses: No evidence of dural venous sinus thrombosis within the limitation of angiographic contrast imaging. NECK CTA: Normal variant common origin of the right brachiocephalic and left common carotid arteries. Mild multifocal narrowing in the common carotid arteries from their origins to the bifurcation. Irregularity and atherosclerotic calcification at the carotid bulbs. There is less than 50% narrowing of the right ICA by NASCET criteria at its origin. There is moderate stenosis of the distal cervical right ICA. There is less than 50% narrowing of the left ICA by NASCET criteria at its origin. Otherwise, normal contrast enhancement of the internal carotid arteries from their origins to the proximal intradural portions.Irregularity and atherosclerotic calcification of the petrous and cavernous carotid arteries. There is diminutive to absent opacification of the cervical right vertebral artery from the distal V1 segments to the mid to distal V2 segment. The cervical left vertebral artery appears patent. The left vertebral artery is dominant. There is mild stenosis at the proximal left subclavian artery. 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 Angiographic contrast timing precludes accurate assessement of jugular vein patency. SOFT TISSUE NECK: No exophytic lesion seen within the aerodigestive tract. No pathologic appearing lymph nodes by imaging criteria. The visualized glands appear unremarkable. No acute or aggressive appearing osseous  or soft tissue process.Degenerative changes of the imaged spine. Emphysema. Biapical pleural-parenchymal scarring. Status post CABG     Impression: Impression: No acute intracranial findings on noncontrast head CT. Brain MRI would be more sensitive for the evaluation of acute ischemia in the setting of persistent focal neurologic deficits. No proximal large vessel occlusion or major stenosis of the intracranial vasculature. There is diminutive to absent opacification of the cervical right vertebral artery from the distal V1 segment to the mid to distal V2 segment. There is severe stenosis of the proximal right subclavian artery. Findings can be seen with subclavian steal syndrome. Correlation with Doppler ultrasound is recommended; reported prior carotid ultrasound demonstrated no evidence of subclavian steal syndrome, though images aren't available for direct comparison. Moderate stenosis of the distal cervical right ICA. Critical Findings were verbally communicated with POLLY Gill by Dima Good MD on 2/25/2025 at 10:00 p.m. Electronically Signed: Dima Good  2/25/2025 10:00 PM EST  Workstation ID: [id number]         Current medications:  Scheduled Meds:[Held by provider] amLODIPine, 10 mg, Oral, Q24H  aspirin, 81 mg, Oral, Daily  [Held by provider] carvedilol, 6.25 mg, Oral, BID With Meals  cholecalciferol, 1,000 Units, Oral, Daily  citalopram, 10 mg, Oral, Daily  demeclocycline, 150 mg, Oral, Q12H  donepezil, 5 mg, Oral, Nightly  heparin (porcine), 5,000 Units, Subcutaneous, Q8H  levothyroxine, 50 mcg, Oral, Q AM  pantoprazole, 40 mg, Oral, BID AC  ranolazine, 500 mg, Oral, Q12H  rosuvastatin, 20 mg, Oral, Nightly  sodium chloride, 10 mL, Intravenous, Q12H  [Held by provider] tamsulosin, 0.4 mg, Oral, Nightly      Continuous Infusions:   PRN Meds:.  acetaminophen **OR** acetaminophen **OR** acetaminophen    senna-docusate sodium **AND** polyethylene glycol **AND** bisacodyl **AND** bisacodyl    Calcium  Replacement - Follow Nurse / BPA Driven Protocol    Magnesium Cardiology Dose Replacement - Follow Nurse / BPA Driven Protocol    Phosphorus Replacement - Follow Nurse / BPA Driven Protocol    Potassium Replacement - Follow Nurse / BPA Driven Protocol    [COMPLETED] Insert Peripheral IV **AND** sodium chloride    sodium chloride    sodium chloride    Assessment & Plan   Assessment & Plan     Active Hospital Problems    Diagnosis  POA    **Subclavian steal syndrome of right subclavian artery [G45.8]  Yes    Stroke [I63.9]  Yes    Carotid stenosis [I65.29]  Yes    Dyslipidemia [E78.5]  Yes    Episodic lightheadedness [R42]  Yes    Subclavian artery stenosis [I77.1]  Yes    Vertigo [R42]  Yes    Paraesophageal hiatal hernia [K44.9]  Yes    Hypothyroid [E03.9]  Yes    CAD (coronary artery disease) [I25.10]  Yes      Resolved Hospital Problems   No resolved problems to display.        Brief Hospital Course to date:  Jolanta Coppola is a 84 y.o. female with past medical history of coronary artery disease status post remote CABG, multiple TIAs, carotid stenosis, hypothyroidism, SIADH, hyperlipidemia, GERD.  She also has a history of progressive subclavian steal syndrome.     Subclavian steal syndrome, right  Progressive near syncopal episodes  Carotid stenosis  History of TIA  History of coronary artery disease status post remote CABG  -Progressive symptoms, impairing quality of life and functional status.  She had followed with vascular surgery at [facility].  She is status post revascularization of lithotripsy on right side March 2024.  She did have significant issues postoperatively, family reports significant anemia and difficulty recovering from surgery.  She initially experienced relief and then had progressive symptoms and has been particularly impairing quality of life over the past several weeks.  Imaging and ultrasound reports outlined in H+P.  -MRI neg. Continue aspirin, Crestor, ranolazine  -Vascular  surgery to see.      Elevated serum creatinine, resolved  -Oral rehydration, recheck in morning     History of SIADH  -data deficit, Na currently normal, continue demeclocycline      Hypertension  -Home meds on hold pending MRI     History of urinary retention  -Flomax on hold pending MRI     Hypothyroidism  -Continue home Synthroid     GERD  -Home PPI     Depression  -Citalopram     Age-related cognitive impairment  -On donepezil     Declines blood products due to [place], okay with heparin    Expected Discharge Location and Transportation:   Expected Discharge   Expected discharge date/ time has not been documented.     VTE Prophylaxis:  Pharmacologic VTE prophylaxis orders are present.         AM-PAC 6 Clicks Score (PT): 22 (02/26/25 0019)    CODE STATUS:   Code Status and Medical Interventions: CPR (Attempt to Resuscitate); Full Support   Ordered at: 02/25/25 6244     Level Of Support Discussed With:    Patient     Code Status (Patient has no pulse and is not breathing):    CPR (Attempt to Resuscitate)     Medical Interventions (Patient has pulse or is breathing):    Full Support       Germania Scherer II, DO  02/26/25

## 2025-02-26 NOTE — H&P
[facility] Medicine Services  HISTORY AND PHYSICAL    Patient Name: Jolanta Coppola  : 1940  MRN: [id number]  Primary Care Physician: Benita Bruce APRN  Date of admission: 2025      Subjective   Subjective     Chief Complaint:  Lightheadedness    HPI:  Jolanta Coppola is a 84 y.o. female with past medical history of coronary artery disease status post remote CABG, multiple TIAs, carotid stenosis, hypothyroidism, SIADH, hyperlipidemia, GERD.  She also has a history of progressive subclavian steal syndrome.  She has been having progressive lightheadedness and near syncope with exertion and position change, has been following with vascular specialist the ED for [place].  She was diagnosed with subclavian steal syndrome on the right side in .  In March of last year she underwent revascularization of the right subclavian, initial stent was unsuccessful and lithotripsy was performed.  Her lightheadedness and near syncope has been increasing in frequency since then and now significantly impairs her quality of life.  She is instructed to come to [facility] for second opinion as her most recent carotid duplex on  showed continued retrograde right vertebral flow, 90% stenosis of the right SCA, as well as bilateral ICA stenosis.  Otherwise she is not having any new neurological symptoms, shortness of air, chest pain.  She is a non-smoker and does not drink alcohol.      Personal History     History reviewed. No pertinent past medical history.        History reviewed. No pertinent surgical history.    Family History: family history is not on file.     Social History:    Social History     Social History Narrative    Not on file       Medications:  Available home medication information reviewed.       Allergies   Allergen Reactions    Scopolamine Confusion    Valsartan Unknown - High Severity    Meclizine Hallucinations    Red Blood Cells Other (See Comments)     Declines blood  for reasons related to [employer]       Objective   Objective     Vital Signs:   Temp:  [98.3 °F (36.8 °C)] 98.3 °F (36.8 °C)  Heart Rate:  [60-77] 68  Resp:  [18] 18  BP: (126-217)/(59-87) 151/75       Physical Exam   Awake alert oriented x 3  Extract movements intact, pupils equal and reactive  Facial expression intact  Tongue at midline  Neck flexion intact  Bilateral carotid bruits on auscultation  No pronator drift upper or lower extremities  Delayed radial pulse right side  Heart regular rate and rhythm  Lungs clear to auscultation  Abdomen soft, nontender  Coordination intact, upper and lower  Sensation grossly intact  About 50 mmHg systolic pressure difference, greater in left arm than right    Result Review:  I have personally reviewed the results from the time of this admission to 2/25/2025 23:38 EST and agree with these findings:  [x]  Laboratory list / accordion  []  Microbiology  [x]  Radiology  [x]  EKG/Telemetry   [x]  Cardiology/Vascular   []  Pathology  [x]  Old records  []  Other:  Most notable findings include: See assessment and plan      LAB RESULTS:      Lab 02/25/25 2044   WBC 6.51   HEMOGLOBIN 12.3   HEMATOCRIT 37.9   PLATELETS 282   NEUTROS ABS 3.43   IMMATURE GRANS (ABS) 0.03   LYMPHS ABS 2.13   MONOS ABS 0.65   EOS ABS 0.20   MCV 87.9   PROTIME 13.2   INR 0.99         Lab 02/25/25 2111 02/25/25 2044   SODIUM  --  139   POTASSIUM  --  4.6   CHLORIDE  --  103   CO2  --  25.0   ANION GAP  --  11.0   BUN  --  18   CREATININE 1.20 1.13*   EGFR  --  47.5*   GLUCOSE  --  99   CALCIUM  --  8.9   TSH  --  3.020         Lab 02/25/25 2044   TOTAL PROTEIN 6.6   ALBUMIN 3.8   GLOBULIN 2.8   ALT (SGPT) 13   AST (SGOT) 26   BILIRUBIN 0.2   ALK PHOS 58         Lab 02/25/25 2143   HSTROP T 17*                     Microbiology Results (last 10 days)       ** No results found for the last 240 hours. **            XR Chest 1 View    Result Date: 2/25/2025  XR CHEST 1 VW Date of Exam: 2/25/2025 9:13 PM EST  Indication: near syncope Comparison: Chest radiograph 1/30/2007 Findings: Mediastinum: Cardiac silhouette appears unchanged including postoperative changes Lungs: The lungs appear clear without focal consolidation appreciated. Pleura: No pleural effusion or pneumothorax. Bones and soft tissues: No acute, displaced fracture seen. Median sternotomy.     Impression: Impression: No radiographic evidence of acute cardiopulmonary abnormality. Electronically Signed: Dima Good  2/25/2025 10:02 PM EST  Workstation ID: [id number]    CT Head Without Contrast    Result Date: 2/25/2025  CT HEAD WO CONTRAST, CT ANGIOGRAM NECK, CT ANGIOGRAM HEAD W AI ANALYSIS OF LVO Date of Exam: 2/25/2025 9:22 PM EST Indication: dizziness, near syncope. Comparison: None available. Technique: Axial CT images were obtained of the head without contrast administration.  Automated exposure control and iterative construction methods were used. CTA of the head and neck was performed after the uneventful intravenous administration of 80 mL Isovue-370. Reconstructed coronal and sagittal images were also obtained. In addition, a 3-D volume rendered image was created for interpretation. Automated exposure control and iterative reconstruction methods were used.  Findings: Noncontrast head: No acute intracranial hemorrhage.Intact appearing gray-white differentiation.No extra-axial fluid collection.No significant mass effect. No hydrocephalus. Mild generalized parenchymal volume loss. Scattered areas of periventricular and subcortical white matter hypoattenuation, nonspecific, perhaps from small vessel ischemic/hypertensive changes in a patient of this age.There are intracranial atherosclerotic calcifications. Mild scattered mucosal thickening in the paranasal sinuses.Mastoid air cells are essentially clear.. Bilateral lens replacements. No acute or aggressive appearing bony or extracranial soft tissue process. HEAD CTA: Anterior circulation: No proximal  large vessel occlusion or major stenosis. Posterior circulation: No proximal large vessel occlusion or major stenosis. Major dural venous sinuses: No evidence of dural venous sinus thrombosis within the limitation of angiographic contrast imaging. NECK CTA: Normal variant common origin of the right brachiocephalic and left common carotid arteries. Mild multifocal narrowing in the common carotid arteries from their origins to the bifurcation. Irregularity and atherosclerotic calcification at the carotid bulbs. There is less than 50% narrowing of the right ICA by NASCET criteria at its origin. There is moderate stenosis of the distal cervical right ICA. There is less than 50% narrowing of the left ICA by NASCET criteria at its origin. Otherwise, normal contrast enhancement of the internal carotid arteries from their origins to the proximal intradural portions.Irregularity and atherosclerotic calcification of the petrous and cavernous carotid arteries. There is diminutive to absent opacification of the cervical right vertebral artery from the distal V1 segments to the mid to distal V2 segment. The cervical left vertebral artery appears patent. The left vertebral artery is dominant. There is mild stenosis at the proximal left subclavian artery. 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 Angiographic contrast timing precludes accurate assessement of jugular vein patency. SOFT TISSUE NECK: No exophytic lesion seen within the aerodigestive tract. No pathologic appearing lymph nodes by imaging criteria. The visualized glands appear unremarkable. No acute or aggressive appearing osseous or soft tissue process.Degenerative changes of the imaged spine. Emphysema. Biapical pleural-parenchymal scarring. Status post CABG     Impression: Impression: No acute intracranial findings on noncontrast head CT. Brain MRI would be more sensitive for the evaluation of acute ischemia in the  setting of persistent focal neurologic deficits. No proximal large vessel occlusion or major stenosis of the intracranial vasculature. There is diminutive to absent opacification of the cervical right vertebral artery from the distal V1 segment to the mid to distal V2 segment. There is severe stenosis of the proximal right subclavian artery. Findings can be seen with subclavian steal syndrome. Correlation with Doppler ultrasound is recommended; reported prior carotid ultrasound demonstrated no evidence of subclavian steal syndrome, though images aren't available for direct comparison. Moderate stenosis of the distal cervical right ICA. Critical Findings were verbally communicated with POLLY Gill by Dima Good MD on 2/25/2025 at 10:00 p.m. Electronically Signed: Dima Good  2/25/2025 10:00 PM EST  Workstation ID: [id number]    CT Angiogram Head w AI Analysis of LVO    Result Date: 2/25/2025  CT HEAD WO CONTRAST, CT ANGIOGRAM NECK, CT ANGIOGRAM HEAD W AI ANALYSIS OF LVO Date of Exam: 2/25/2025 9:22 PM EST Indication: dizziness, near syncope. Comparison: None available. Technique: Axial CT images were obtained of the head without contrast administration.  Automated exposure control and iterative construction methods were used. CTA of the head and neck was performed after the uneventful intravenous administration of 80 mL Isovue-370. Reconstructed coronal and sagittal images were also obtained. In addition, a 3-D volume rendered image was created for interpretation. Automated exposure control and iterative reconstruction methods were used.  Findings: Noncontrast head: No acute intracranial hemorrhage.Intact appearing gray-white differentiation.No extra-axial fluid collection.No significant mass effect. No hydrocephalus. Mild generalized parenchymal volume loss. Scattered areas of periventricular and subcortical white matter hypoattenuation, nonspecific, perhaps from small vessel ischemic/hypertensive changes in a  patient of this age.There are intracranial atherosclerotic calcifications. Mild scattered mucosal thickening in the paranasal sinuses.Mastoid air cells are essentially clear.. Bilateral lens replacements. No acute or aggressive appearing bony or extracranial soft tissue process. HEAD CTA: Anterior circulation: No proximal large vessel occlusion or major stenosis. Posterior circulation: No proximal large vessel occlusion or major stenosis. Major dural venous sinuses: No evidence of dural venous sinus thrombosis within the limitation of angiographic contrast imaging. NECK CTA: Normal variant common origin of the right brachiocephalic and left common carotid arteries. Mild multifocal narrowing in the common carotid arteries from their origins to the bifurcation. Irregularity and atherosclerotic calcification at the carotid bulbs. There is less than 50% narrowing of the right ICA by NASCET criteria at its origin. There is moderate stenosis of the distal cervical right ICA. There is less than 50% narrowing of the left ICA by NASCET criteria at its origin. Otherwise, normal contrast enhancement of the internal carotid arteries from their origins to the proximal intradural portions.Irregularity and atherosclerotic calcification of the petrous and cavernous carotid arteries. There is diminutive to absent opacification of the cervical right vertebral artery from the distal V1 segments to the mid to distal V2 segment. The cervical left vertebral artery appears patent. The left vertebral artery is dominant. There is mild stenosis at the proximal left subclavian artery. 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 Angiographic contrast timing precludes accurate assessement of jugular vein patency. SOFT TISSUE NECK: No exophytic lesion seen within the aerodigestive tract. No pathologic appearing lymph nodes by imaging criteria. The visualized glands appear unremarkable. No acute  or aggressive appearing osseous or soft tissue process.Degenerative changes of the imaged spine. Emphysema. Biapical pleural-parenchymal scarring. Status post CABG     Impression: Impression: No acute intracranial findings on noncontrast head CT. Brain MRI would be more sensitive for the evaluation of acute ischemia in the setting of persistent focal neurologic deficits. No proximal large vessel occlusion or major stenosis of the intracranial vasculature. There is diminutive to absent opacification of the cervical right vertebral artery from the distal V1 segment to the mid to distal V2 segment. There is severe stenosis of the proximal right subclavian artery. Findings can be seen with subclavian steal syndrome. Correlation with Doppler ultrasound is recommended; reported prior carotid ultrasound demonstrated no evidence of subclavian steal syndrome, though images aren't available for direct comparison. Moderate stenosis of the distal cervical right ICA. Critical Findings were verbally communicated with POLLY Gill by Dima Good MD on 2/25/2025 at 10:00 p.m. Electronically Signed: Dima Good  2/25/2025 10:00 PM EST  Workstation ID: [id number]    CT Angiogram Neck    Result Date: 2/25/2025  CT HEAD WO CONTRAST, CT ANGIOGRAM NECK, CT ANGIOGRAM HEAD W AI ANALYSIS OF LVO Date of Exam: 2/25/2025 9:22 PM EST Indication: dizziness, near syncope. Comparison: None available. Technique: Axial CT images were obtained of the head without contrast administration.  Automated exposure control and iterative construction methods were used. CTA of the head and neck was performed after the uneventful intravenous administration of 80 mL Isovue-370. Reconstructed coronal and sagittal images were also obtained. In addition, a 3-D volume rendered image was created for interpretation. Automated exposure control and iterative reconstruction methods were used.  Findings: Noncontrast head: No acute intracranial hemorrhage.Intact appearing  gray-white differentiation.No extra-axial fluid collection.No significant mass effect. No hydrocephalus. Mild generalized parenchymal volume loss. Scattered areas of periventricular and subcortical white matter hypoattenuation, nonspecific, perhaps from small vessel ischemic/hypertensive changes in a patient of this age.There are intracranial atherosclerotic calcifications. Mild scattered mucosal thickening in the paranasal sinuses.Mastoid air cells are essentially clear.. Bilateral lens replacements. No acute or aggressive appearing bony or extracranial soft tissue process. HEAD CTA: Anterior circulation: No proximal large vessel occlusion or major stenosis. Posterior circulation: No proximal large vessel occlusion or major stenosis. Major dural venous sinuses: No evidence of dural venous sinus thrombosis within the limitation of angiographic contrast imaging. NECK CTA: Normal variant common origin of the right brachiocephalic and left common carotid arteries. Mild multifocal narrowing in the common carotid arteries from their origins to the bifurcation. Irregularity and atherosclerotic calcification at the carotid bulbs. There is less than 50% narrowing of the right ICA by NASCET criteria at its origin. There is moderate stenosis of the distal cervical right ICA. There is less than 50% narrowing of the left ICA by NASCET criteria at its origin. Otherwise, normal contrast enhancement of the internal carotid arteries from their origins to the proximal intradural portions.Irregularity and atherosclerotic calcification of the petrous and cavernous carotid arteries. There is diminutive to absent opacification of the cervical right vertebral artery from the distal V1 segments to the mid to distal V2 segment. The cervical left vertebral artery appears patent. The left vertebral artery is dominant. There is mild stenosis at the proximal left subclavian artery. There is calcified osteoporosis at the proximal right  subclavian artery with severe stenosis. Angiographic contrast timing precludes accurate assessement of jugular vein patency. SOFT TISSUE NECK: No exophytic lesion seen within the aerodigestive tract. No pathologic appearing lymph nodes by imaging criteria. The visualized glands appear unremarkable. No acute or aggressive appearing osseous or soft tissue process.Degenerative changes of the imaged spine. Emphysema. Biapical pleural-parenchymal scarring. Status post CABG     Impression: Impression: No acute intracranial findings on noncontrast head CT. Brain MRI would be more sensitive for the evaluation of acute ischemia in the setting of persistent focal neurologic deficits. No proximal large vessel occlusion or major stenosis of the intracranial vasculature. There is diminutive to absent opacification of the cervical right vertebral artery from the distal V1 segment to the mid to distal V2 segment. There is severe stenosis of the proximal right subclavian artery. Findings can be seen with subclavian steal syndrome. Correlation with Doppler ultrasound is recommended; reported prior carotid ultrasound demonstrated no evidence of subclavian steal syndrome, though images aren't available for direct comparison. Moderate stenosis of the distal cervical right ICA. Critical Findings were verbally communicated with POLLY Gill by Dima Good MD on 2/25/2025 at 10:00 p.m. Electronically Signed: Dima Good  2/25/2025 10:00 PM EST  Workstation ID: [id number]        File Link    Scan on 2/25/2025 2239 by Yosef Markham MD: Carotid duplex 2/5/25        Key Information    Document ID File Type Document Type Description   [id number] Image CLINICAL PHOTOGRAPHS OTHER Carotid duplex 2/5/25     Import Information    Attached At Date Time User Dept   Encounter Level 2/25/2025 10:39 PM Yosef Markham MD [facility] Emergency Dept     Encounter    Hospital Encounter on 2/25/25            File Link    Scan on 2/25/2025 8847  by Yosef Markham MD        Scott Information    Document ID File Type Document Type Description   [id number] Image CLINICAL PHOTOGRAPHS OTHER      Import Information    Attached At Date Time User Dept   Encounter Level 2/25/2025 10:54 PM Yosef Markham MD Replaced by [facility] Emergency [facility]     Encounter    Hospital Encounter on 2/25/25          File Link    Scan on 2/25/2025 2255 by Yosef Markham MD        Scott Information    Document ID File Type Document Type Description   [id number] Image CLINICAL PHOTOGRAPHS OTHER      Import Information    Attached At Date Time User Dept   Encounter Level 2/25/2025 10:55 PM Yosef Markham MD Replaced by [facility] Emergency Dept     Encounter    Hospital Encounter on 2/25/25       Assessment & Plan   Assessment & Plan       Subclavian steal syndrome of right subclavian artery    Stroke    CAD (coronary artery disease)    Episodic lightheadedness    Vertigo    Dyslipidemia    Hypothyroid    Paraesophageal hiatal hernia    Carotid stenosis    Subclavian artery stenosis    Subclavian steal syndrome, right  Progressive near syncopal episodes  Carotid stenosis  History of TIA  History of coronary artery disease status post remote CABG  -Progressive symptoms, impairing quality of life and functional status.  She had followed with vascular surgery at [facility].  She is status post revascularization of lithotripsy on right side March 2024.  She did have significant issues postoperatively, family reports significant anemia and difficulty recovering from surgery.  She initially experienced relief and then had progressive symptoms and has been particularly impairing quality of life over the past several weeks.  Imaging and ultrasound reports outlined above.  Plan:  -Continue aspirin, Crestor, ranolazine  -A1c, lipid panel.  -Stroke team verbally recommended MRI head, will follow-up and if new findings we will ask them to formally see  -Vascular surgery in the morning    -Can also  consider medication effect and worsening episodes, donepezil, citalopram, Flomax, amlodipine, and carvedilol potential contributing agents    Elevated serum creatinine  -Oral rehydration, recheck in morning    History of SIADH  -data deficit, Na currently normal, continue demeclocycline     Hypertension  -Home meds on hold pending MRI    History of urinary retention  -Flomax on hold pending MRI    Hypothyroidism  -Continue home Synthroid    GERD  -Home PPI    Depression  -Citalopram    Age-related cognitive impairment  -On donepezil    Declines blood products due to [employer]ology, okay with heparin        VTE Prophylaxis:  Pharmacologic VTE prophylaxis orders are signed & held.            CODE STATUS:    Code Status and Medical Interventions: CPR (Attempt to Resuscitate); Full Support   Ordered at: 02/25/25 2813     Level Of Support Discussed With:    Patient     Code Status (Patient has no pulse and is not breathing):    CPR (Attempt to Resuscitate)     Medical Interventions (Patient has pulse or is breathing):    Full Support       Expected Discharge   Expected discharge date/ time has not been documented.     Yosef Markham MD  02/25/25

## 2025-02-26 NOTE — ED PROVIDER NOTES
Subjective   History of Present Illness  This is a pleasant 84-year-old female that presents to the ER with abnormal outpatient imaging concerning for subclavian steal syndrome.  Patient has been followed by PCP with outpatient carotid ultrasounds for concern of subclavian steal syndrome.  Patient had carotid ultrasound on 2/5/2025 that revealed findings of 50 to 69% right ICA stenosis, as well as retrograde flow from the right vertebral artery concerning for subclavian steal syndrome.  According to daughter, patient has had dizziness and near syncope over the last several weeks, but it has become more frequent.  She had an episode of significant dizziness and near syncope 3 days ago and again today.  Patient did not have actual syncope.  She denies any chest pain or shortness of breath.  She does have past medical history of stroke without any residual deficits, hypertension, hypothyroidism, obstructive sleep apnea, GERD, and coronary artery disease with previous CABG in 2010, as well as former tobacco use.  Patient is on aspirin 81 mg by mouth daily as well as Crestor 20 mg by mouth every afternoon.  Patient was advised per PCP to come to the ER for further assessment.    History provided by:  Patient, medical records and relative (Daughter)  Dizziness  Quality:  Lightheadedness (Near syncope)  Timing:  Intermittent  Progression:  Worsening  Chronicity:  Recurrent (Sxs have been ongoing for a few weeks)  Relieved by:  Nothing  Worsened by:  Nothing  Ineffective treatments: Pt on ASA 81 mg daily, and Crestor 20 mg Qpm.  Associated symptoms: shortness of breath (Patient reports chronic exertional dyspnea)    Associated symptoms: no blood in stool, no chest pain, no diarrhea, no headaches, no hearing loss, no nausea, no palpitations, no syncope, no tinnitus, no vision changes, no vomiting and no weakness    Associated symptoms comment:  Dizziness and near syncope. Pt has had 2 episodes in the past 3 days.      Review  of Systems   Constitutional: Negative.  Negative for activity change, appetite change, chills, diaphoresis, fatigue and fever.   HENT:  Positive for rhinorrhea. Negative for congestion, hearing loss, sinus pressure, sinus pain, sneezing, sore throat and tinnitus.    Eyes: Negative.  Negative for visual disturbance.   Respiratory:  Positive for shortness of breath (Patient reports chronic exertional dyspnea).         Former tobacco use   Cardiovascular: Negative.  Negative for chest pain, palpitations, leg swelling and syncope.   Gastrointestinal: Negative.  Negative for blood in stool, diarrhea, nausea and vomiting.   Genitourinary: Negative.  Negative for dysuria, flank pain, frequency and urgency.   Musculoskeletal: Negative.  Negative for back pain and gait problem.   Skin: Negative.    Neurological:  Positive for dizziness and light-headedness. Negative for syncope, facial asymmetry, speech difficulty, weakness and headaches.        Abnormal recent carotid ultrasound on 2/5/2025 concerning for subclavian steal syndrome.  Intermittent dizziness with near syncope that has become more frequent over the last few weeks.  Patient has had 2 episodes of dizziness and near syncope over the last 3 days.  Patient denies any headache or unilateral extremity weakness or numbness/tingling.   All other systems reviewed and are negative.      Past Medical History:   Diagnosis Date    Disease of thyroid gland     GERD (gastroesophageal reflux disease)     Hypertension     Sleep apnea     Stroke        Allergies   Allergen Reactions    Scopolamine Confusion    Valsartan Unknown - High Severity    Meclizine Hallucinations    Red Blood Cells Other (See Comments)     Declines blood for reasons related to [employer]       Past Surgical History:   Procedure Laterality Date    ABDOMINAL SURGERY      CORONARY ARTERY BYPASS GRAFT  2010    JOINT REPLACEMENT Bilateral        History reviewed. No pertinent family history.    Social History      Socioeconomic History    Marital status: Single   Tobacco Use    Smoking status: Former     Types: Cigarettes    Smokeless tobacco: Never   Vaping Use    Vaping status: Never Used   Substance and Sexual Activity    Alcohol use: Yes     Alcohol/week: 4.0 standard drinks of alcohol     Types: 4 Glasses of wine per week    Drug use: Never    Sexual activity: Defer           Objective   Physical Exam  Vitals and nursing note reviewed.   Constitutional:       General: She is not in acute distress.     Appearance: Normal appearance. She is not ill-appearing, toxic-appearing or diaphoretic.      Comments: Pleasant elderly female.  No acute distress.  Nontoxic.   HENT:      Head: Normocephalic and atraumatic.      Nose: Nose normal.      Mouth/Throat:      Mouth: Mucous membranes are moist.      Pharynx: Oropharynx is clear.   Eyes:      General: Vision grossly intact.      Extraocular Movements: Extraocular movements intact.      Right eye: Normal extraocular motion and no nystagmus.      Left eye: Normal extraocular motion and no nystagmus.      Conjunctiva/sclera: Conjunctivae normal.      Pupils: Pupils are equal, round, and reactive to light.      Comments: Pupils equal round reactive to light.  Extraocular movements intact.  No nystagmus.   Neck:      Vascular: No carotid bruit or JVD.      Comments: I do not appreciate any carotid bruits bilaterally.  No JVD.  Cardiovascular:      Rate and Rhythm: Normal rate and regular rhythm. Extrasystoles are present.     Pulses: Normal pulses.      Heart sounds: Normal heart sounds. No murmur heard.     Comments: Regular rate with occasional ectopic beat.  No murmurs noted.  No pedal edema to lower extremities.  Pulmonary:      Effort: Pulmonary effort is normal.      Breath sounds: Normal breath sounds. No decreased breath sounds.      Comments: Lungs are clear to auscultation bilaterally  Abdominal:      General: Bowel sounds are normal.      Palpations: Abdomen is soft.    Musculoskeletal:         General: Normal range of motion.      Cervical back: Normal range of motion and neck supple.      Right lower leg: No edema.      Left lower leg: No edema.   Skin:     General: Skin is warm and dry.   Neurological:      General: No focal deficit present.      Mental Status: She is alert and oriented to person, place, and time.      Cranial Nerves: Cranial nerves 2-12 are intact.      Sensory: Sensation is intact.      Motor: Motor function is intact.      Coordination: Coordination is intact.      Comments: Alert and oriented x 3.  Neuro intact and nonfocal.   Psychiatric:         Mood and Affect: Mood and affect normal.         Speech: Speech normal.         Behavior: Behavior normal. Behavior is cooperative.         Thought Content: Thought content normal.         Cognition and Memory: Cognition and memory normal.         Judgment: Judgment normal.      Comments: Normal mood and affect.         Procedures           ED Course  ED Course as of 02/26/25 0112 Tue Feb 25, 2025 2039 Discussed the case with stroke navigator Sandi ANDREA.  She recommended CT angiogram of the brain and neck with and without contrast as well as CT of the head and MRI of the brain without contrast. [FC]   2139 BP: 138/69 [LD]   2139 Creatinine(!): 1.13 [LD]   2139 eGFR(!): 47.5 [LD]   2155 Radiologist called me about CTA proximal right subclavian artery and to the right vertebral with retrograde flow.  He said the CTAs are consistent with subclavian steal syndrome.  Radiologist recommended MRI of the brain for further assessment.  I will page hospitalist to discuss admission. Blood pressures upon arrival were significantly different.  Left arm blood pressure was 217/87 and blood pressure in the right arm was 168/85.  I will page hospitalist to discuss admission. [FC]   2159 I personally interpreted EKG which showed sinus rhythm.  There was no acute ST-T wave changes consistent with ischemia.  CBC and chemistries  were essentially normal other than mild bump in creatinine at 1.13.  TSH is 3.02.  I will page hospitalist, Dr. Cao, to discuss admission. [FC]   Wed Feb 26, 2025 0108 Discussed admission with Dr. Markham, hospitalist.  He is agreeable to admission on telemetry.  He reviewed patient's recent carotid ultrasound reports as well as CT angiograms.  He will follow on MRI of the brain with and without contrast.  I updated patient and family at the bedside on all results and need for admission and they are appreciative and agreeable with hospitalization. [FC]   0109 The formal report from the CT angiogram of the neck revealed diminutive to absent opacification of the cervical right vertebral artery from the distal V1 segment in the mid to distal V2 segment.  There is severe stenosis of the proximal right subclavian artery which can be seen with subclavian steal syndrome.  There were also findings of moderate stenosis of the distal cervical right ICA.  CT of the brain without contrast revealed no acute intracranial findings, but brain MRI would be more sensitive for further evaluation of acute ischemia.  MRI of the brain without contrast was ordered and will be followed by hospitalist team. [FC]   0112 Initial high-sensitivity troponin was 17 and repeat high-sensitivity troponin was 13. [FC]      ED Course User Index  [FC] Sara Gill PA-C  [LD] Katt Roque MD                                             Recent Results (from the past 24 hours)   Comprehensive Metabolic Panel    Collection Time: 02/25/25  8:44 PM    Specimen: Blood   Result Value Ref Range    Glucose 99 65 - 99 mg/dL    BUN 18 8 - 23 mg/dL    Creatinine 1.13 (H) 0.57 - 1.00 mg/dL    Sodium 139 136 - 145 mmol/L    Potassium 4.6 3.5 - 5.2 mmol/L    Chloride 103 98 - 107 mmol/L    CO2 25.0 22.0 - 29.0 mmol/L    Calcium 8.9 8.6 - 10.5 mg/dL    Total Protein 6.6 6.0 - 8.5 g/dL    Albumin 3.8 3.5 - 5.2 g/dL    ALT (SGPT) 13 1 - 33 U/L    AST (SGOT)  26 1 - 32 U/L    Alkaline Phosphatase 58 39 - 117 U/L    Total Bilirubin 0.2 0.0 - 1.2 mg/dL    Globulin 2.8 gm/dL    A/G Ratio 1.4 g/dL    BUN/Creatinine Ratio 15.9 7.0 - 25.0    Anion Gap 11.0 5.0 - 15.0 mmol/L    eGFR 47.5 (L) >60.0 mL/min/1.73   Protime-INR    Collection Time: 02/25/25  8:44 PM    Specimen: Blood   Result Value Ref Range    Protime 13.2 12.2 - 14.5 Seconds    INR 0.99 0.89 - 1.12   CBC Auto Differential    Collection Time: 02/25/25  8:44 PM    Specimen: Blood   Result Value Ref Range    WBC 6.51 3.40 - 10.80 10*3/mm3    RBC 4.31 3.77 - 5.28 10*6/mm3    Hemoglobin 12.3 12.0 - 15.9 g/dL    Hematocrit 37.9 34.0 - 46.6 %    MCV 87.9 79.0 - 97.0 fL    MCH 28.5 26.6 - 33.0 pg    MCHC 32.5 31.5 - 35.7 g/dL    RDW 14.3 12.3 - 15.4 %    RDW-SD 46.4 37.0 - 54.0 fl    MPV 11.6 6.0 - 12.0 fL    Platelets 282 140 - 450 10*3/mm3    Neutrophil % 52.6 42.7 - 76.0 %    Lymphocyte % 32.7 19.6 - 45.3 %    Monocyte % 10.0 5.0 - 12.0 %    Eosinophil % 3.1 0.3 - 6.2 %    Basophil % 1.1 0.0 - 1.5 %    Immature Grans % 0.5 0.0 - 0.5 %    Neutrophils, Absolute 3.43 1.70 - 7.00 10*3/mm3    Lymphocytes, Absolute 2.13 0.70 - 3.10 10*3/mm3    Monocytes, Absolute 0.65 0.10 - 0.90 10*3/mm3    Eosinophils, Absolute 0.20 0.00 - 0.40 10*3/mm3    Basophils, Absolute 0.07 0.00 - 0.20 10*3/mm3    Immature Grans, Absolute 0.03 0.00 - 0.05 10*3/mm3    nRBC 0.0 0.0 - 0.2 /100 WBC   TSH    Collection Time: 02/25/25  8:44 PM    Specimen: Blood   Result Value Ref Range    TSH 3.020 0.270 - 4.200 uIU/mL   Hemoglobin A1c    Collection Time: 02/25/25  8:44 PM    Specimen: Blood   Result Value Ref Range    Hemoglobin A1C 5.50 4.80 - 5.60 %   ECG 12 Lead Other; dizziness, near syncope    Collection Time: 02/25/25  8:49 PM   Result Value Ref Range    QT Interval 432 ms    QTC Interval 452 ms   POC Creatinine    Collection Time: 02/25/25  9:11 PM    Specimen: Blood   Result Value Ref Range    Creatinine 1.20 0.60 - 1.30 mg/dL   High Sensitivity  Troponin T    Collection Time: 02/25/25  9:43 PM    Specimen: Blood   Result Value Ref Range    HS Troponin T 17 (H) <14 ng/L   Lipid Panel    Collection Time: 02/25/25  9:43 PM    Specimen: Blood   Result Value Ref Range    Total Cholesterol 124 0 - 200 mg/dL    Triglycerides 118 0 - 150 mg/dL    HDL Cholesterol 58 40 - 60 mg/dL    LDL Cholesterol  45 0 - 100 mg/dL    VLDL Cholesterol 21 5 - 40 mg/dL    LDL/HDL Ratio 0.73    High Sensitivity Troponin T 1Hr    Collection Time: 02/26/25 12:24 AM    Specimen: Blood   Result Value Ref Range    HS Troponin T 13 <14 ng/L    Troponin T Numeric Delta -4 ng/L    Troponin T % Delta -24 Abnormal if >/= 20%     Note: In addition to lab results from this visit, the labs listed above may include labs taken at another facility or during a different encounter within the last 24 hours. Please correlate lab times with ED admission and discharge times for further clarification of the services performed during this visit.    XR Chest 1 View   Final Result   Impression:   No radiographic evidence of acute cardiopulmonary abnormality.            Electronically Signed: Dima Good     2/25/2025 10:02 PM EST     Workstation ID: [id number]      CT Head Without Contrast   Final Result   Impression:   No acute intracranial findings on noncontrast head CT. Brain MRI would be more sensitive for the evaluation of acute ischemia in the setting of persistent focal neurologic deficits.      No proximal large vessel occlusion or major stenosis of the intracranial vasculature.      There is diminutive to absent opacification of the cervical right vertebral artery from the distal V1 segment to the mid to distal V2 segment. There is severe stenosis of the proximal right subclavian artery. Findings can be seen with subclavian steal    syndrome. Correlation with Doppler ultrasound is recommended; reported prior carotid ultrasound demonstrated no evidence of subclavian steal syndrome, though images  aren't available for direct comparison.      Moderate stenosis of the distal cervical right ICA.      Critical Findings were verbally communicated with POLLY Gill by Dima Good MD on 2/25/2025 at 10:00 p.m.      Electronically Signed: Dima Good     2/25/2025 10:00 PM EST     Workstation ID: [id number]      CT Angiogram Head w AI Analysis of LVO   Final Result   Impression:   No acute intracranial findings on noncontrast head CT. Brain MRI would be more sensitive for the evaluation of acute ischemia in the setting of persistent focal neurologic deficits.      No proximal large vessel occlusion or major stenosis of the intracranial vasculature.      There is diminutive to absent opacification of the cervical right vertebral artery from the distal V1 segment to the mid to distal V2 segment. There is severe stenosis of the proximal right subclavian artery. Findings can be seen with subclavian steal    syndrome. Correlation with Doppler ultrasound is recommended; reported prior carotid ultrasound demonstrated no evidence of subclavian steal syndrome, though images aren't available for direct comparison.      Moderate stenosis of the distal cervical right ICA.      Critical Findings were verbally communicated with POLLY Gill by Dima Good MD on 2/25/2025 at 10:00 p.m.      Electronically Signed: Dima Good     2/25/2025 10:00 PM EST     Workstation ID: [id number]      CT Angiogram Neck   Final Result   Impression:   No acute intracranial findings on noncontrast head CT. Brain MRI would be more sensitive for the evaluation of acute ischemia in the setting of persistent focal neurologic deficits.      No proximal large vessel occlusion or major stenosis of the intracranial vasculature.      There is diminutive to absent opacification of the cervical right vertebral artery from the distal V1 segment to the mid to distal V2 segment. There is severe stenosis of the proximal right subclavian artery. Findings can be  seen with subclavian steal    syndrome. Correlation with Doppler ultrasound is recommended; reported prior carotid ultrasound demonstrated no evidence of subclavian steal syndrome, though images aren't available for direct comparison.      Moderate stenosis of the distal cervical right ICA.      Critical Findings were verbally communicated with POLLY Gill by Dima Good MD on 2/25/2025 at 10:00 p.m.      Electronically Signed: Dima Good     2/25/2025 10:00 PM EST     Workstation ID: [id number]      MRI Brain Without Contrast    (Results Pending)     Vitals:    02/25/25 2200 02/25/25 2230 02/25/25 2258 02/26/25 0036   BP: 126/59 128/60 151/75 138/61   BP Location:       Patient Position:       Pulse: 60 62 68 67   Resp:       Temp:       TempSrc:       SpO2: 100% 99% 99%    Weight:       Height:         Medications   sodium chloride 0.9 % flush 10 mL (has no administration in time range)   aspirin chewable tablet 81 mg (has no administration in time range)   rosuvastatin (CRESTOR) tablet 20 mg (20 mg Oral Given 2/26/25 0036)   amLODIPine (NORVASC) tablet 10 mg ( Oral Dose Auto Held 3/6/25 0900)   carvedilol (COREG) tablet 6.25 mg ( Oral Dose Auto Held 3/6/25 1800)   citalopram (CeleXA) tablet 10 mg (has no administration in time range)   donepezil (ARICEPT) tablet 5 mg (5 mg Oral Given 2/26/25 0036)   levothyroxine (SYNTHROID, LEVOTHROID) tablet 50 mcg (has no administration in time range)   pantoprazole (PROTONIX) EC tablet 40 mg (has no administration in time range)   ranolazine (RANEXA) 12 hr tablet 500 mg (500 mg Oral Given 2/26/25 0036)   tamsulosin (FLOMAX) 24 hr capsule 0.4 mg ( Oral Dose Auto Held 3/13/25 2100)   demeclocycline (DECLOMYCIN) tablet 150 mg (has no administration in time range)   cholecalciferol (VITAMIN D3) tablet 1,000 Units (has no administration in time range)   iopamidol (ISOVUE-370) 76 % injection 100 mL (80 mL Intravenous Given 2/25/25 2135)     ECG/EMG Results (last 24 hours)        ** No results found for the last 24 hours. **          ECG 12 Lead Other; dizziness, near syncope   Preliminary Result   Test Reason : Other~   Blood Pressure :   */*   mmHG   Vent. Rate :  66 BPM     Atrial Rate :  66 BPM      P-R Int : 114 ms          QRS Dur :  92 ms       QT Int : 432 ms       P-R-T Axes :  45  37  77 degrees     QTcB Int : 452 ms      Normal sinus rhythm   Nonspecific ST and T wave abnormality   Abnormal ECG   No previous ECGs available      Referred By: ED MD           Confirmed By:                     Medical Decision Making  Amount and/or Complexity of Data Reviewed  Labs: ordered. Decision-making details documented in ED Course.  Radiology: ordered.  ECG/medicine tests: ordered.    Risk  Prescription drug management.  Decision regarding hospitalization.        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   Dizziness   Near syncope   History of hypertension   History of hyperlipidemia   History of stroke   History of coronary artery disease   History of obstructive sleep apnea       ED Disposition  ED Disposition       ED Disposition   Decision to Admit    Condition   --    Comment   Level of Care: Telemetry [5]   Diagnosis: Stroke [228174]   Admitting Physician: SÁNCHEZ HARTMANN [[id number]]   Attending Physician: SÁNCHEZ HARTMANN [[id number]]   Certification: I Certify That Inpatient Hospital Services Are Medically Necessary For Greater Than 2 Midnights                 No follow-up provider specified.       Medication List      No changes were made to your prescriptions during this visit.            Sara Gill PA-C  02/26/25 0113

## 2025-02-27 LAB
CREAT BLDA-MCNC: 1.2 MG/DL (ref 0.6–1.3)
MAGNESIUM SERPL-MCNC: 2.2 MG/DL (ref 1.6–2.4)
QT INTERVAL: 432 MS
QTC INTERVAL: 452 MS

## 2025-02-27 PROCEDURE — 25010000002 HEPARIN (PORCINE) PER 1000 UNITS: Performed by: STUDENT IN AN ORGANIZED HEALTH CARE EDUCATION/TRAINING PROGRAM

## 2025-02-27 PROCEDURE — 83735 ASSAY OF MAGNESIUM: CPT | Performed by: INTERNAL MEDICINE

## 2025-02-27 PROCEDURE — 99232 SBSQ HOSP IP/OBS MODERATE 35: CPT | Performed by: STUDENT IN AN ORGANIZED HEALTH CARE EDUCATION/TRAINING PROGRAM

## 2025-02-27 RX ADMIN — ASPIRIN 81 MG CHEWABLE TABLET 81 MG: 81 TABLET CHEWABLE at 08:49

## 2025-02-27 RX ADMIN — Medication 1000 UNITS: at 08:49

## 2025-02-27 RX ADMIN — Medication 10 ML: at 08:50

## 2025-02-27 RX ADMIN — RANOLAZINE 500 MG: 500 TABLET, FILM COATED, EXTENDED RELEASE ORAL at 21:05

## 2025-02-27 RX ADMIN — DEMECLOCYCLINE HYDROCHLORIDE 150 MG: 150 TABLET, FILM COATED ORAL at 08:49

## 2025-02-27 RX ADMIN — RANOLAZINE 500 MG: 500 TABLET, FILM COATED, EXTENDED RELEASE ORAL at 08:49

## 2025-02-27 RX ADMIN — HEPARIN SODIUM 5000 UNITS: 5000 INJECTION INTRAVENOUS; SUBCUTANEOUS at 06:31

## 2025-02-27 RX ADMIN — TAMSULOSIN HYDROCHLORIDE 0.4 MG: 0.4 CAPSULE ORAL at 21:04

## 2025-02-27 RX ADMIN — DEMECLOCYCLINE HYDROCHLORIDE 150 MG: 150 TABLET, FILM COATED ORAL at 21:05

## 2025-02-27 RX ADMIN — LEVOTHYROXINE SODIUM 50 MCG: 0.05 TABLET ORAL at 06:31

## 2025-02-27 RX ADMIN — Medication 10 ML: at 21:05

## 2025-02-27 RX ADMIN — CITALOPRAM HYDROBROMIDE 10 MG: 20 TABLET ORAL at 08:49

## 2025-02-27 RX ADMIN — DONEPEZIL HYDROCHLORIDE 5 MG: 5 TABLET ORAL at 21:05

## 2025-02-27 RX ADMIN — PANTOPRAZOLE SODIUM 40 MG: 40 TABLET, DELAYED RELEASE ORAL at 16:50

## 2025-02-27 RX ADMIN — PANTOPRAZOLE SODIUM 40 MG: 40 TABLET, DELAYED RELEASE ORAL at 06:31

## 2025-02-27 RX ADMIN — ROSUVASTATIN 20 MG: 20 TABLET, FILM COATED ORAL at 21:04

## 2025-02-27 RX ADMIN — HEPARIN SODIUM 5000 UNITS: 5000 INJECTION INTRAVENOUS; SUBCUTANEOUS at 14:48

## 2025-02-27 RX ADMIN — HEPARIN SODIUM 5000 UNITS: 5000 INJECTION INTRAVENOUS; SUBCUTANEOUS at 21:05

## 2025-02-27 NOTE — PLAN OF CARE
Goal Outcome Evaluation:         No significant events overnight. Patient ambulates to the bathroom with assistx1 and gait belt. A&Ox4 on RA. BP elevated but stable

## 2025-02-27 NOTE — PLAN OF CARE
Goal Outcome Evaluation:                 Patient alert and oriented x4, ambulates well to bathroom with stand by assistance, very pleasant and conversant, awaiting angiography on 03/04, no further needs at this time

## 2025-02-27 NOTE — PROGRESS NOTES
[facility] Medicine Services  PROGRESS NOTE    Patient Name: Jolanta Coppola  : 1940  MRN: [id number]    Date of Admission: 2025  Primary Care Physician: Benita Bruce APRN    Subjective   Subjective     CC:  Syncope, subclavian steal    HPI:  Patient seen resting comfortably in bed no acute distress.  Patient states she has lightheadedness, dizziness, and near syncope/syncope with nearly every ambulation.  Patient states symptoms have been worsening over the past year.  Denies any symptoms while at rest.  Patient states she was recently evaluated by her PCP who recommended inpatient evaluation.      Objective   Objective     Vital Signs:   Temp:  [97.6 °F (36.4 °C)-98.4 °F (36.9 °C)] 97.6 °F (36.4 °C)  Heart Rate:  [60-62] 60  Resp:  [16-18] 18  BP: (157-180)/(57-72) 180/72     Physical Exam  Constitutional:       General: She is not in acute distress.  Cardiovascular:      Rate and Rhythm: Normal rate.      Pulses: Normal pulses.   Pulmonary:      Effort: Pulmonary effort is normal. No respiratory distress.   Abdominal:      Palpations: Abdomen is soft.      Tenderness: There is no abdominal tenderness. There is no guarding or rebound.   Musculoskeletal:      Right lower leg: No edema.      Left lower leg: No edema.   Skin:     General: Skin is warm.   Neurological:      Mental Status: She is alert and oriented to person, place, and time.   Psychiatric:         Mood and Affect: Mood normal.         Behavior: Behavior normal.          Results Reviewed:  LAB RESULTS:      Lab 25  0024 25  2143 254   WBC  --   --  6.51   HEMOGLOBIN  --   --  12.3   HEMATOCRIT  --   --  37.9   PLATELETS  --   --  282   NEUTROS ABS  --   --  3.43   IMMATURE GRANS (ABS)  --   --  0.03   LYMPHS ABS  --   --  2.13   MONOS ABS  --   --  0.65   EOS ABS  --   --  0.20   MCV  --   --  87.9   PROTIME  --   --  13.2   HSTROP T 13 17*  --          Lab 25  0516  02/26/25  0751 02/25/25 2111 02/25/25 2044   SODIUM  --  140  --  139   POTASSIUM  --  4.1  --  4.6   CHLORIDE  --  104  --  103   CO2  --  26.0  --  25.0   ANION GAP  --  10.0  --  11.0   BUN  --  13  --  18   CREATININE  --  0.81 1.20 1.13*   EGFR  --  70.8  --  47.5*   GLUCOSE  --  86  --  99   CALCIUM  --  8.9  --  8.9   MAGNESIUM 2.2 1.9  --   --    HEMOGLOBIN A1C  --   --   --  5.50   TSH  --   --   --  3.020         Lab 02/25/25 2044   TOTAL PROTEIN 6.6   ALBUMIN 3.8   GLOBULIN 2.8   ALT (SGPT) 13   AST (SGOT) 26   BILIRUBIN 0.2   ALK PHOS 58         Lab 02/26/25  0024 02/25/25 2143 02/25/25 2044   HSTROP T 13 17*  --    PROTIME  --   --  13.2   INR  --   --  0.99         Lab 02/25/25 2143   CHOLESTEROL 124   LDL CHOL 45   HDL CHOL 58   TRIGLYCERIDES 118             Brief Urine Lab Results       None            Microbiology Results Abnormal       None            MRI Brain Without Contrast    Result Date: 2/26/2025  MRI BRAIN WO CONTRAST Date of Exam: 2/25/2025 11:58 PM EST Indication: dizziness, near syncope, 90% R SCA stenosis.  Comparison: CT head without 2/25/2025. Technique:  Routine multiplanar/multisequence sequence images of the brain were obtained without contrast administration. Findings: There is no diffusion restriction to suggest acute infarct. There is no evidence of acute or chronic intracranial hemorrhage. No mass effect or midline shift. No abnormal extra-axial collections. There is mild to moderate generalized atrophy. Mild subcortical and periventricular T2/FLAIR white matter hyperintensities most likely reflect the sequela of chronic microvascular ischemic disease. Calvarial and superficial soft tissue signal is within normal limits. Orbits appear unremarkable. The paranasal sinuses and the mastoid air cells appear well aerated.     Impression: Impression: 1. No acute intracranial process. 2. Age-related atrophy and chronic microvascular ischemic changes. Electronically Signed:  Thalia Zamora MD  2/26/2025 6:54 AM EST  Workstation ID: [id number]    XR Chest 1 View    Result Date: 2/25/2025  XR CHEST 1 VW Date of Exam: 2/25/2025 9:13 PM EST Indication: near syncope Comparison: Chest radiograph 1/30/2007 Findings: Mediastinum: Cardiac silhouette appears unchanged including postoperative changes Lungs: The lungs appear clear without focal consolidation appreciated. Pleura: No pleural effusion or pneumothorax. Bones and soft tissues: No acute, displaced fracture seen. Median sternotomy.     Impression: Impression: No radiographic evidence of acute cardiopulmonary abnormality. Electronically Signed: Dima Good  2/25/2025 10:02 PM EST  Workstation ID: [id number]    CT Head Without Contrast    Result Date: 2/25/2025  CT HEAD WO CONTRAST, CT ANGIOGRAM NECK, CT ANGIOGRAM HEAD W AI ANALYSIS OF LVO Date of Exam: 2/25/2025 9:22 PM EST Indication: dizziness, near syncope. Comparison: None available. Technique: Axial CT images were obtained of the head without contrast administration.  Automated exposure control and iterative construction methods were used. CTA of the head and neck was performed after the uneventful intravenous administration of 80 mL Isovue-370. Reconstructed coronal and sagittal images were also obtained. In addition, a 3-D volume rendered image was created for interpretation. Automated exposure control and iterative reconstruction methods were used.  Findings: Noncontrast head: No acute intracranial hemorrhage.Intact appearing gray-white differentiation.No extra-axial fluid collection.No significant mass effect. No hydrocephalus. Mild generalized parenchymal volume loss. Scattered areas of periventricular and subcortical white matter hypoattenuation, nonspecific, perhaps from small vessel ischemic/hypertensive changes in a patient of this age.There are intracranial atherosclerotic calcifications. Mild scattered mucosal thickening in the paranasal sinuses.Mastoid air cells are  essentially clear.. Bilateral lens replacements. No acute or aggressive appearing bony or extracranial soft tissue process. HEAD CTA: Anterior circulation: No proximal large vessel occlusion or major stenosis. Posterior circulation: No proximal large vessel occlusion or major stenosis. Major dural venous sinuses: No evidence of dural venous sinus thrombosis within the limitation of angiographic contrast imaging. NECK CTA: Normal variant common origin of the right brachiocephalic and left common carotid arteries. Mild multifocal narrowing in the common carotid arteries from their origins to the bifurcation. Irregularity and atherosclerotic calcification at the carotid bulbs. There is less than 50% narrowing of the right ICA by NASCET criteria at its origin. There is moderate stenosis of the distal cervical right ICA. There is less than 50% narrowing of the left ICA by NASCET criteria at its origin. Otherwise, normal contrast enhancement of the internal carotid arteries from their origins to the proximal intradural portions.Irregularity and atherosclerotic calcification of the petrous and cavernous carotid arteries. There is diminutive to absent opacification of the cervical right vertebral artery from the distal V1 segments to the mid to distal V2 segment. The cervical left vertebral artery appears patent. The left vertebral artery is dominant. There is mild stenosis at the proximal left subclavian artery. 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 Angiographic contrast timing precludes accurate assessement of jugular vein patency. SOFT TISSUE NECK: No exophytic lesion seen within the aerodigestive tract. No pathologic appearing lymph nodes by imaging criteria. The visualized glands appear unremarkable. No acute or aggressive appearing osseous or soft tissue process.Degenerative changes of the imaged spine. Emphysema. Biapical pleural-parenchymal scarring. Status  post CABG     Impression: Impression: No acute intracranial findings on noncontrast head CT. Brain MRI would be more sensitive for the evaluation of acute ischemia in the setting of persistent focal neurologic deficits. No proximal large vessel occlusion or major stenosis of the intracranial vasculature. There is diminutive to absent opacification of the cervical right vertebral artery from the distal V1 segment to the mid to distal V2 segment. There is severe stenosis of the proximal right subclavian artery. Findings can be seen with subclavian steal syndrome. Correlation with Doppler ultrasound is recommended; reported prior carotid ultrasound demonstrated no evidence of subclavian steal syndrome, though images aren't available for direct comparison. Moderate stenosis of the distal cervical right ICA. Critical Findings were verbally communicated with POLLY Gill by Dima Good MD on 2/25/2025 at 10:00 p.m. Electronically Signed: Dima Good  2/25/2025 10:00 PM EST  Workstation ID: [id number]    CT Angiogram Head w AI Analysis of LVO    Result Date: 2/25/2025  CT HEAD WO CONTRAST, CT ANGIOGRAM NECK, CT ANGIOGRAM HEAD W AI ANALYSIS OF LVO Date of Exam: 2/25/2025 9:22 PM EST Indication: dizziness, near syncope. Comparison: None available. Technique: Axial CT images were obtained of the head without contrast administration.  Automated exposure control and iterative construction methods were used. CTA of the head and neck was performed after the uneventful intravenous administration of 80 mL Isovue-370. Reconstructed coronal and sagittal images were also obtained. In addition, a 3-D volume rendered image was created for interpretation. Automated exposure control and iterative reconstruction methods were used.  Findings: Noncontrast head: No acute intracranial hemorrhage.Intact appearing gray-white differentiation.No extra-axial fluid collection.No significant mass effect. No hydrocephalus. Mild generalized  parenchymal volume loss. Scattered areas of periventricular and subcortical white matter hypoattenuation, nonspecific, perhaps from small vessel ischemic/hypertensive changes in a patient of this age.There are intracranial atherosclerotic calcifications. Mild scattered mucosal thickening in the paranasal sinuses.Mastoid air cells are essentially clear.. Bilateral lens replacements. No acute or aggressive appearing bony or extracranial soft tissue process. HEAD CTA: Anterior circulation: No proximal large vessel occlusion or major stenosis. Posterior circulation: No proximal large vessel occlusion or major stenosis. Major dural venous sinuses: No evidence of dural venous sinus thrombosis within the limitation of angiographic contrast imaging. NECK CTA: Normal variant common origin of the right brachiocephalic and left common carotid arteries. Mild multifocal narrowing in the common carotid arteries from their origins to the bifurcation. Irregularity and atherosclerotic calcification at the carotid bulbs. There is less than 50% narrowing of the right ICA by NASCET criteria at its origin. There is moderate stenosis of the distal cervical right ICA. There is less than 50% narrowing of the left ICA by NASCET criteria at its origin. Otherwise, normal contrast enhancement of the internal carotid arteries from their origins to the proximal intradural portions.Irregularity and atherosclerotic calcification of the petrous and cavernous carotid arteries. There is diminutive to absent opacification of the cervical right vertebral artery from the distal V1 segments to the mid to distal V2 segment. The cervical left vertebral artery appears patent. The left vertebral artery is dominant. There is mild stenosis at the proximal left subclavian artery. 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 Angiographic contrast timing precludes accurate assessement of jugular vein patency. SOFT  TISSUE NECK: No exophytic lesion seen within the aerodigestive tract. No pathologic appearing lymph nodes by imaging criteria. The visualized glands appear unremarkable. No acute or aggressive appearing osseous or soft tissue process.Degenerative changes of the imaged spine. Emphysema. Biapical pleural-parenchymal scarring. Status post CABG     Impression: Impression: No acute intracranial findings on noncontrast head CT. Brain MRI would be more sensitive for the evaluation of acute ischemia in the setting of persistent focal neurologic deficits. No proximal large vessel occlusion or major stenosis of the intracranial vasculature. There is diminutive to absent opacification of the cervical right vertebral artery from the distal V1 segment to the mid to distal V2 segment. There is severe stenosis of the proximal right subclavian artery. Findings can be seen with subclavian steal syndrome. Correlation with Doppler ultrasound is recommended; reported prior carotid ultrasound demonstrated no evidence of subclavian steal syndrome, though images aren't available for direct comparison. Moderate stenosis of the distal cervical right ICA. Critical Findings were verbally communicated with POLLY Gill by Dima Good MD on 2/25/2025 at 10:00 p.m. Electronically Signed: Dima Good  2/25/2025 10:00 PM EST  Workstation ID: [id number]    CT Angiogram Neck    Result Date: 2/25/2025  CT HEAD WO CONTRAST, CT ANGIOGRAM NECK, CT ANGIOGRAM HEAD W AI ANALYSIS OF LVO Date of Exam: 2/25/2025 9:22 PM EST Indication: dizziness, near syncope. Comparison: None available. Technique: Axial CT images were obtained of the head without contrast administration.  Automated exposure control and iterative construction methods were used. CTA of the head and neck was performed after the uneventful intravenous administration of 80 mL Isovue-370. Reconstructed coronal and sagittal images were also obtained. In addition, a 3-D volume rendered image was  created for interpretation. Automated exposure control and iterative reconstruction methods were used.  Findings: Noncontrast head: No acute intracranial hemorrhage.Intact appearing gray-white differentiation.No extra-axial fluid collection.No significant mass effect. No hydrocephalus. Mild generalized parenchymal volume loss. Scattered areas of periventricular and subcortical white matter hypoattenuation, nonspecific, perhaps from small vessel ischemic/hypertensive changes in a patient of this age.There are intracranial atherosclerotic calcifications. Mild scattered mucosal thickening in the paranasal sinuses.Mastoid air cells are essentially clear.. Bilateral lens replacements. No acute or aggressive appearing bony or extracranial soft tissue process. HEAD CTA: Anterior circulation: No proximal large vessel occlusion or major stenosis. Posterior circulation: No proximal large vessel occlusion or major stenosis. Major dural venous sinuses: No evidence of dural venous sinus thrombosis within the limitation of angiographic contrast imaging. NECK CTA: Normal variant common origin of the right brachiocephalic and left common carotid arteries. Mild multifocal narrowing in the common carotid arteries from their origins to the bifurcation. Irregularity and atherosclerotic calcification at the carotid bulbs. There is less than 50% narrowing of the right ICA by NASCET criteria at its origin. There is moderate stenosis of the distal cervical right ICA. There is less than 50% narrowing of the left ICA by NASCET criteria at its origin. Otherwise, normal contrast enhancement of the internal carotid arteries from their origins to the proximal intradural portions.Irregularity and atherosclerotic calcification of the petrous and cavernous carotid arteries. There is diminutive to absent opacification of the cervical right vertebral artery from the distal V1 segments to the mid to distal V2 segment. The cervical left vertebral  artery appears patent. The left vertebral artery is dominant. There is mild stenosis at the proximal left subclavian artery. 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 Angiographic contrast timing precludes accurate assessement of jugular vein patency. SOFT TISSUE NECK: No exophytic lesion seen within the aerodigestive tract. No pathologic appearing lymph nodes by imaging criteria. The visualized glands appear unremarkable. No acute or aggressive appearing osseous or soft tissue process.Degenerative changes of the imaged spine. Emphysema. Biapical pleural-parenchymal scarring. Status post CABG     Impression: Impression: No acute intracranial findings on noncontrast head CT. Brain MRI would be more sensitive for the evaluation of acute ischemia in the setting of persistent focal neurologic deficits. No proximal large vessel occlusion or major stenosis of the intracranial vasculature. There is diminutive to absent opacification of the cervical right vertebral artery from the distal V1 segment to the mid to distal V2 segment. There is severe stenosis of the proximal right subclavian artery. Findings can be seen with subclavian steal syndrome. Correlation with Doppler ultrasound is recommended; reported prior carotid ultrasound demonstrated no evidence of subclavian steal syndrome, though images aren't available for direct comparison. Moderate stenosis of the distal cervical right ICA. Critical Findings were verbally communicated with POLLY Gill by Dima Good MD on 2/25/2025 at 10:00 p.m. Electronically Signed: Dima Good  2/25/2025 10:00 PM EST  Workstation ID: [id number]         Current medications:  Scheduled Meds:[Held by provider] amLODIPine, 10 mg, Oral, Q24H  aspirin, 81 mg, Oral, Daily  [Held by provider] carvedilol, 6.25 mg, Oral, BID With Meals  cholecalciferol, 1,000 Units, Oral, Daily  citalopram, 10 mg, Oral, Daily  demeclocycline, 150 mg, Oral,  Q12H  donepezil, 5 mg, Oral, Nightly  heparin (porcine), 5,000 Units, Subcutaneous, Q8H  levothyroxine, 50 mcg, Oral, Q AM  pantoprazole, 40 mg, Oral, BID AC  ranolazine, 500 mg, Oral, Q12H  rosuvastatin, 20 mg, Oral, Nightly  sodium chloride, 10 mL, Intravenous, Q12H  [Held by provider] tamsulosin, 0.4 mg, Oral, Nightly      Continuous Infusions:   PRN Meds:.  acetaminophen **OR** acetaminophen **OR** acetaminophen    senna-docusate sodium **AND** polyethylene glycol **AND** bisacodyl **AND** bisacodyl    Calcium Replacement - Follow Nurse / BPA Driven Protocol    Magnesium Cardiology Dose Replacement - Follow Nurse / BPA Driven Protocol    Phosphorus Replacement - Follow Nurse / BPA Driven Protocol    Potassium Replacement - Follow Nurse / BPA Driven Protocol    [COMPLETED] Insert Peripheral IV **AND** sodium chloride    sodium chloride    sodium chloride    Assessment & Plan   Assessment & Plan     Active Hospital Problems    Diagnosis  POA    **Subclavian steal syndrome of right subclavian artery [G45.8]  Yes    Stroke [I63.9]  Yes    Carotid stenosis [I65.29]  Yes    Dyslipidemia [E78.5]  Yes    Episodic lightheadedness [R42]  Yes    Subclavian artery stenosis [I77.1]  Yes    Vertigo [R42]  Yes    Paraesophageal hiatal hernia [K44.9]  Yes    Hypothyroid [E03.9]  Yes    CAD (coronary artery disease) [I25.10]  Yes      Resolved Hospital Problems   No resolved problems to display.        Brief Hospital Course to date:  Jolanta Coppola is a 84 y.o. female with past medical history of coronary artery disease status post remote CABG, multiple TIAs, carotid stenosis, hypothyroidism, SIADH, hyperlipidemia, GERD.  She also has a history of progressive subclavian steal syndrome.  Patient had progressively worsening increased frequency of lightheadedness, dizziness, and syncope with ambulation or standing up abruptly.  Vascular surgery consulted for subclavian steal syndrome, planning for OR on Tuesday.     Subclavian  steal syndrome, right  Progressive near syncopal episodes  Carotid stenosis  History of TIA  History of coronary artery disease status post remote CABG  -Progressive symptoms, impairing quality of life and functional status.  She had followed with [facility] surgery at [facility].  She is status post revascularization of lithotripsy on right side March 2024.  She did have significant issues postoperatively, family reports significant anemia and difficulty recovering from surgery.  She initially experienced relief and then had progressive symptoms and has been particularly impairing quality of life over the past several weeks.   -MRI brain without acute intracranial abnormality  -Vascular surgery consulted, planning for OR on Tuesday  -Continue aspirin, Crestor, ranolazine      Elevated serum creatinine, resolved  -Encourage p.o. 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     VTE Prophylaxis:  Pharmacologic VTE prophylaxis orders are present.         AM-PAC 6 Clicks Score (PT): 18 (02/26/25 2100)    CODE STATUS:   Code Status and Medical Interventions: CPR (Attempt to Resuscitate); Full Support   Ordered at: 02/25/25 8995     Level Of Support Discussed With:    Patient     Code Status (Patient has no pulse and is not breathing):    CPR (Attempt to Resuscitate)     Medical Interventions (Patient has pulse or is breathing):    Full Support       Bernardo Mejia, DO  02/27/25

## 2025-02-28 PROCEDURE — 25010000002 HEPARIN (PORCINE) PER 1000 UNITS: Performed by: STUDENT IN AN ORGANIZED HEALTH CARE EDUCATION/TRAINING PROGRAM

## 2025-02-28 PROCEDURE — 99232 SBSQ HOSP IP/OBS MODERATE 35: CPT | Performed by: STUDENT IN AN ORGANIZED HEALTH CARE EDUCATION/TRAINING PROGRAM

## 2025-02-28 RX ADMIN — DEMECLOCYCLINE HYDROCHLORIDE 150 MG: 150 TABLET, FILM COATED ORAL at 20:00

## 2025-02-28 RX ADMIN — RANOLAZINE 500 MG: 500 TABLET, FILM COATED, EXTENDED RELEASE ORAL at 08:42

## 2025-02-28 RX ADMIN — CARVEDILOL 6.25 MG: 6.25 TABLET, FILM COATED ORAL at 08:42

## 2025-02-28 RX ADMIN — HEPARIN SODIUM 5000 UNITS: 5000 INJECTION INTRAVENOUS; SUBCUTANEOUS at 05:47

## 2025-02-28 RX ADMIN — ROSUVASTATIN 20 MG: 20 TABLET, FILM COATED ORAL at 20:00

## 2025-02-28 RX ADMIN — CARVEDILOL 6.25 MG: 6.25 TABLET, FILM COATED ORAL at 18:43

## 2025-02-28 RX ADMIN — RANOLAZINE 500 MG: 500 TABLET, FILM COATED, EXTENDED RELEASE ORAL at 20:01

## 2025-02-28 RX ADMIN — Medication 1000 UNITS: at 08:42

## 2025-02-28 RX ADMIN — PANTOPRAZOLE SODIUM 40 MG: 40 TABLET, DELAYED RELEASE ORAL at 05:46

## 2025-02-28 RX ADMIN — CITALOPRAM HYDROBROMIDE 10 MG: 20 TABLET ORAL at 08:42

## 2025-02-28 RX ADMIN — DONEPEZIL HYDROCHLORIDE 5 MG: 5 TABLET ORAL at 20:00

## 2025-02-28 RX ADMIN — PANTOPRAZOLE SODIUM 40 MG: 40 TABLET, DELAYED RELEASE ORAL at 18:48

## 2025-02-28 RX ADMIN — ASPIRIN 81 MG CHEWABLE TABLET 81 MG: 81 TABLET CHEWABLE at 08:42

## 2025-02-28 RX ADMIN — DEMECLOCYCLINE HYDROCHLORIDE 150 MG: 150 TABLET, FILM COATED ORAL at 08:42

## 2025-02-28 RX ADMIN — TAMSULOSIN HYDROCHLORIDE 0.4 MG: 0.4 CAPSULE ORAL at 20:01

## 2025-02-28 RX ADMIN — AMLODIPINE BESYLATE 10 MG: 10 TABLET ORAL at 08:42

## 2025-02-28 RX ADMIN — Medication 10 ML: at 18:50

## 2025-02-28 RX ADMIN — LEVOTHYROXINE SODIUM 50 MCG: 0.05 TABLET ORAL at 05:47

## 2025-02-28 RX ADMIN — HEPARIN SODIUM 5000 UNITS: 5000 INJECTION INTRAVENOUS; SUBCUTANEOUS at 18:48

## 2025-02-28 RX ADMIN — Medication 10 ML: at 20:03

## 2025-02-28 NOTE — PLAN OF CARE
Problem: Adult Inpatient Plan of Care  Goal: Plan of Care Review  Outcome: Progressing  Flowsheets (Taken 2/28/2025 0411)  Progress: improving  Plan of Care Reviewed With: patient  Goal: Patient-Specific Goal (Individualized)  Outcome: Progressing  Goal: Absence of Hospital-Acquired Illness or Injury  Outcome: Progressing  Intervention: Identify and Manage Fall Risk  Recent Flowsheet Documentation  Taken 2/28/2025 0407 by Lisa Morrow RN  Safety Promotion/Fall Prevention:   activity supervised   assistive device/personal items within reach   safety round/check completed  Taken 2/28/2025 0238 by Lisa Morrow RN  Safety Promotion/Fall Prevention:   activity supervised   assistive device/personal items within reach   safety round/check completed  Taken 2/28/2025 0000 by Lisa Morrow RN  Safety Promotion/Fall Prevention:   activity supervised   assistive device/personal items within reach   safety round/check completed  Taken 2/27/2025 2205 by Lisa Morrow RN  Safety Promotion/Fall Prevention:   activity supervised   assistive device/personal items within reach   safety round/check completed  Taken 2/27/2025 2052 by Lisa Morrow RN  Safety Promotion/Fall Prevention:   activity supervised   assistive device/personal items within reach   clutter free environment maintained   fall prevention program maintained   lighting adjusted   muscle strengthening facilitated   nonskid shoes/slippers when out of bed   room organization consistent   safety round/check completed  Intervention: Prevent Skin Injury  Recent Flowsheet Documentation  Taken 2/28/2025 0407 by Lisa Morrow RN  Body Position: position changed independently  Taken 2/28/2025 0238 by Lisa Morrow RN  Body Position: position changed independently  Taken 2/28/2025 0000 by Lisa Morrow RN  Body Position: supine  Taken 2/27/2025 2205 by Lisa Morrow RN  Body Position: position changed independently  Taken 2/27/2025 2052 by Cristhian  Lisa PRADHAN RN  Body Position: position changed independently  Skin Protection:   drying agents applied   incontinence pads utilized  Intervention: Prevent and Manage VTE (Venous Thromboembolism) Risk  Recent Flowsheet Documentation  Taken 2/28/2025 0000 by Lisa Morrow RN  VTE Prevention/Management: patient refused intervention  Taken 2/27/2025 2205 by Lisa Morrow RN  VTE Prevention/Management: patient refused intervention  Taken 2/27/2025 2052 by Lisa Morrow RN  VTE Prevention/Management:   bilateral   SCDs (sequential compression devices) on  Intervention: Prevent Infection  Recent Flowsheet Documentation  Taken 2/28/2025 0407 by Lisa Morrow RN  Infection Prevention:   hand hygiene promoted   rest/sleep promoted  Taken 2/28/2025 0238 by Lisa Morrow RN  Infection Prevention:   hand hygiene promoted   rest/sleep promoted  Taken 2/28/2025 0000 by Lisa Morrow RN  Infection Prevention:   hand hygiene promoted   rest/sleep promoted  Taken 2/27/2025 2205 by Lisa Morrow RN  Infection Prevention:   hand hygiene promoted   rest/sleep promoted  Taken 2/27/2025 2052 by Lisa Morrow RN  Infection Prevention:   hand hygiene promoted   rest/sleep promoted  Goal: Optimal Comfort and Wellbeing  Outcome: Progressing  Intervention: Provide Person-Centered Care  Recent Flowsheet Documentation  Taken 2/28/2025 0407 by Lisa Morrow RN  Trust Relationship/Rapport: care explained  Taken 2/28/2025 0238 by Lisa Morrow RN  Trust Relationship/Rapport: care explained  Taken 2/28/2025 0000 by Lisa Morrow RN  Trust Relationship/Rapport: care explained  Taken 2/27/2025 2205 by Lisa Morrow RN  Trust Relationship/Rapport: care explained  Taken 2/27/2025 2052 by Lisa Morrow RN  Trust Relationship/Rapport:   care explained   choices provided   questions answered   questions encouraged   reassurance provided  Goal: Readiness for Transition of Care  Outcome: Progressing     Problem: Fall  Injury Risk  Goal: Absence of Fall and Fall-Related Injury  Outcome: Progressing  Intervention: Identify and Manage Contributors  Recent Flowsheet Documentation  Taken 2/28/2025 0407 by Lisa Morrow RN  Self-Care Promotion: independence encouraged  Taken 2/28/2025 0238 by Lisa Morrow RN  Self-Care Promotion: independence encouraged  Taken 2/28/2025 0000 by Lisa Morrow RN  Self-Care Promotion: independence encouraged  Taken 2/27/2025 2205 by Lisa Morrow RN  Self-Care Promotion: independence encouraged  Taken 2/27/2025 2052 by Lisa Morrow RN  Medication Review/Management: medications reviewed  Self-Care Promotion: independence encouraged  Intervention: Promote Injury-Free Environment  Recent Flowsheet Documentation  Taken 2/28/2025 0407 by Lisa Morrow RN  Safety Promotion/Fall Prevention:   activity supervised   assistive device/personal items within reach   safety round/check completed  Taken 2/28/2025 0238 by Lisa Morrow RN  Safety Promotion/Fall Prevention:   activity supervised   assistive device/personal items within reach   safety round/check completed  Taken 2/28/2025 0000 by Lisa Morrow RN  Safety Promotion/Fall Prevention:   activity supervised   assistive device/personal items within reach   safety round/check completed  Taken 2/27/2025 2205 by Lisa Morrow RN  Safety Promotion/Fall Prevention:   activity supervised   assistive device/personal items within reach   safety round/check completed  Taken 2/27/2025 2052 by Lisa Morrow RN  Safety Promotion/Fall Prevention:   activity supervised   assistive device/personal items within reach   clutter free environment maintained   fall prevention program maintained   lighting adjusted   muscle strengthening facilitated   nonskid shoes/slippers when out of bed   room organization consistent   safety round/check completed     Problem: Comorbidity Management  Goal: Maintenance of Asthma Control  Outcome:  Progressing  Intervention: Maintain Asthma Symptom Control  Recent Flowsheet Documentation  Taken 2/27/2025 2052 by Lisa Morrow RN  Medication Review/Management: medications reviewed  Goal: Maintenance of Behavioral Health Symptom Control  Outcome: Progressing  Intervention: Maintain Behavioral Health Symptom Control  Recent Flowsheet Documentation  Taken 2/27/2025 2052 by Lisa Morrow RN  Medication Review/Management: medications reviewed  Goal: Maintenance of COPD Symptom Control  Outcome: Progressing  Intervention: Maintain COPD (Chronic Obstructive Pulmonary Disease) Symptom Control  Recent Flowsheet Documentation  Taken 2/27/2025 2052 by Lisa Morrow RN  Medication Review/Management: medications reviewed  Goal: Blood Glucose Level Within Target Range  Outcome: Progressing  Intervention: Monitor and Manage Glycemia  Recent Flowsheet Documentation  Taken 2/27/2025 2052 by Lisa Morrow RN  Medication Review/Management: medications reviewed  Goal: Maintenance of Heart Failure Symptom Control  Outcome: Progressing  Intervention: Maintain Heart Failure Management  Recent Flowsheet Documentation  Taken 2/27/2025 2052 by Lisa Morrow RN  Medication Review/Management: medications reviewed  Goal: Blood Pressure in Desired Range  Outcome: Progressing  Intervention: Maintain Blood Pressure Management  Recent Flowsheet Documentation  Taken 2/27/2025 2052 by Lisa Morrow RN  Medication Review/Management: medications reviewed  Goal: Maintenance of Osteoarthritis Symptom Control  Outcome: Progressing  Intervention: Maintain Osteoarthritis Symptom Control  Recent Flowsheet Documentation  Taken 2/28/2025 0407 by Lisa Morrow RN  Activity Management: activity encouraged  Taken 2/28/2025 0238 by Lisa Morrow RN  Activity Management: activity encouraged  Taken 2/28/2025 0000 by Lisa Morrow RN  Activity Management: activity encouraged  Taken 2/27/2025 2205 by Lisa Morrow RN  Activity  Management: activity encouraged  Taken 2/27/2025 2052 by Lisa Morrow RN  Activity Management: ambulated outside room  Assistive Device Utilized: gait belt  Medication Review/Management: medications reviewed  Goal: Bariatric Home Regimen Maintained  Outcome: Progressing  Intervention: Maintain and Manage Postbariatric Surgery Care  Recent Flowsheet Documentation  Taken 2/27/2025 2052 by Lisa Morrow RN  Medication Review/Management: medications reviewed  Goal: Maintenance of Seizure Control  Outcome: Progressing  Intervention: Maintain Seizure Symptom Control  Recent Flowsheet Documentation  Taken 2/27/2025 2052 by Lisa Morrow RN  Medication Review/Management: medications reviewed     Problem: Syncope  Goal: Absence of Syncopal Symptoms  Outcome: Progressing  Intervention: Manage Effect of Syncopal Symptoms  Recent Flowsheet Documentation  Taken 2/28/2025 0407 by Lisa Morrow RN  Safety Promotion/Fall Prevention:   activity supervised   assistive device/personal items within reach   safety round/check completed  Taken 2/28/2025 0238 by Lisa Morrow RN  Safety Promotion/Fall Prevention:   activity supervised   assistive device/personal items within reach   safety round/check completed  Taken 2/28/2025 0000 by Lisa Morrow RN  Safety Promotion/Fall Prevention:   activity supervised   assistive device/personal items within reach   safety round/check completed  Taken 2/27/2025 2205 by Lisa Morrow RN  Safety Promotion/Fall Prevention:   activity supervised   assistive device/personal items within reach   safety round/check completed  Taken 2/27/2025 2052 by Lisa Morrow RN  Safety Promotion/Fall Prevention:   activity supervised   assistive device/personal items within reach   clutter free environment maintained   fall prevention program maintained   lighting adjusted   muscle strengthening facilitated   nonskid shoes/slippers when out of bed   room organization consistent   safety  round/check completed   Goal Outcome Evaluation:  Plan of Care Reviewed With: patient      Pt currently in bed resting quietly. No complaints of pain or discomfort at this time. Pt ambulatory with walker. Pt SOB and fatigued quickly when ambulating. VSS. Voiding well. Pt expressively states desire to go home today and return on 3/4 when procedure is scheduled. No other observations at this time, call bell in reach.  Progress: improving

## 2025-02-28 NOTE — CASE MANAGEMENT/SOCIAL WORK
Continued Stay Note  [facility]     Patient Name: Jolanta Coppola  MRN: [id number]  Today's Date: 2/28/2025    Admit Date: 2/25/2025    Plan: home   Discharge Plan       Row Name 02/28/25 1029       Plan    Plan Comments Pt is not medically ready for discharge. Plan for surgery early next week. CM will continue to follow and will make referrals as needed.                   Discharge Codes    No documentation.                       Brea Orona RN

## 2025-02-28 NOTE — PROGRESS NOTES
[facility] Medicine Services  PROGRESS NOTE    Patient Name: Jolanta Coppola  : 1940  MRN: [id number]    Date of Admission: 2025  Primary Care Physician: Benita Bruce APRN    Subjective   Subjective     CC:  Syncope, subclavian steal    HPI:  Patient seen resting comfortably in bed no acute distress.  Patient reports extreme lightheadedness, dizziness and near syncope when working with therapy yesterday.  Planning for OR on Tuesday.  No acute complaints/concerns today.    Objective   Objective     Vital Signs:   Temp:  [97.8 °F (36.6 °C)-98.3 °F (36.8 °C)] 98 °F (36.7 °C)  Heart Rate:  [51-65] 61  Resp:  [16] 16  BP: (100-175)/(57-76) 121/76     Physical Exam  Constitutional:       General: She is not in acute distress.  Cardiovascular:      Rate and Rhythm: Normal rate.      Pulses: Normal pulses.   Pulmonary:      Effort: Pulmonary effort is normal. No respiratory distress.   Abdominal:      Palpations: Abdomen is soft.      Tenderness: There is no abdominal tenderness. There is no guarding or rebound.   Musculoskeletal:      Right lower leg: No edema.      Left lower leg: No edema.   Skin:     General: Skin is warm.   Neurological:      Mental Status: She is alert and oriented to person, place, and time.   Psychiatric:         Mood and Affect: Mood normal.         Behavior: Behavior normal.          Results Reviewed:  LAB RESULTS:      Lab 25  0024 25   WBC  --   --  6.51   HEMOGLOBIN  --   --  12.3   HEMATOCRIT  --   --  37.9   PLATELETS  --   --  282   NEUTROS ABS  --   --  3.43   IMMATURE GRANS (ABS)  --   --  0.03   LYMPHS ABS  --   --  2.13   MONOS ABS  --   --  0.65   EOS ABS  --   --  0.20   MCV  --   --  87.9   PROTIME  --   --  13.2   HSTROP T 13 17*  --          Lab 25  0516 25  0751 251 25   SODIUM  --  140  --   --  139   POTASSIUM  --  4.1  --   --  4.6   CHLORIDE  --  104   --   --  103   CO2  --  26.0  --   --  25.0   ANION GAP  --  10.0  --   --  11.0   BUN  --  13  --   --  18   CREATININE  --  0.81 1.20 1.20 1.13*   EGFR  --  70.8  --   --  47.5*   GLUCOSE  --  86  --   --  99   CALCIUM  --  8.9  --   --  8.9   MAGNESIUM 2.2 1.9  --   --   --    HEMOGLOBIN A1C  --   --   --   --  5.50   TSH  --   --   --   --  3.020         Lab 02/25/25 2044   TOTAL PROTEIN 6.6   ALBUMIN 3.8   GLOBULIN 2.8   ALT (SGPT) 13   AST (SGOT) 26   BILIRUBIN 0.2   ALK PHOS 58         Lab 02/26/25  0024 02/25/25 2143 02/25/25 2044   HSTROP T 13 17*  --    PROTIME  --   --  13.2   INR  --   --  0.99         Lab 02/25/25 2143   CHOLESTEROL 124   LDL CHOL 45   HDL CHOL 58   TRIGLYCERIDES 118             Brief Urine Lab Results       None            Microbiology Results Abnormal       None            No radiology results from the last 24 hrs        Current medications:  Scheduled Meds:amLODIPine, 10 mg, Oral, Q24H  aspirin, 81 mg, Oral, Daily  carvedilol, 6.25 mg, Oral, BID With Meals  cholecalciferol, 1,000 Units, Oral, Daily  citalopram, 10 mg, Oral, Daily  demeclocycline, 150 mg, Oral, Q12H  donepezil, 5 mg, Oral, Nightly  heparin (porcine), 5,000 Units, Subcutaneous, Q8H  levothyroxine, 50 mcg, Oral, Q AM  pantoprazole, 40 mg, Oral, BID AC  ranolazine, 500 mg, Oral, Q12H  rosuvastatin, 20 mg, Oral, Nightly  sodium chloride, 10 mL, Intravenous, Q12H  tamsulosin, 0.4 mg, Oral, Nightly      Continuous Infusions:   PRN Meds:.  acetaminophen **OR** acetaminophen **OR** acetaminophen    senna-docusate sodium **AND** polyethylene glycol **AND** bisacodyl **AND** bisacodyl    Calcium Replacement - Follow Nurse / BPA Driven Protocol    Magnesium Cardiology Dose Replacement - Follow Nurse / BPA Driven Protocol    Phosphorus Replacement - Follow Nurse / BPA Driven Protocol    Potassium Replacement - Follow Nurse / BPA Driven Protocol    [COMPLETED] Insert Peripheral IV **AND** sodium chloride    sodium chloride     sodium chloride    Assessment & Plan   Assessment & Plan     Active Hospital Problems    Diagnosis  POA    **Subclavian steal syndrome of right subclavian artery [G45.8]  Yes    Stroke [I63.9]  Yes    Carotid stenosis [I65.29]  Yes    Dyslipidemia [E78.5]  Yes    Episodic lightheadedness [R42]  Yes    Subclavian artery stenosis [I77.1]  Yes    Vertigo [R42]  Yes    Paraesophageal hiatal hernia [K44.9]  Yes    Hypothyroid [E03.9]  Yes    CAD (coronary artery disease) [I25.10]  Yes      Resolved Hospital Problems   No resolved problems to display.        Brief Hospital Course to date:  Jolanta Coppola is a 84 y.o. female with past medical history of coronary artery disease status post remote CABG, multiple TIAs, carotid stenosis, hypothyroidism, SIADH, hyperlipidemia, GERD.  She also has a history of progressive subclavian steal syndrome.  Patient had progressively worsening increased frequency of lightheadedness, dizziness, and syncope with ambulation or standing up abruptly.  Vascular surgery consulted for subclavian steal syndrome, planning for OR on Tuesday.     Subclavian steal syndrome, right  Progressive near syncopal episodes  Carotid stenosis  History of TIA  History of coronary artery disease status post remote CABG  -Progressive symptoms, impairing quality of life and functional status.  She had followed with vascular surgery at [facility].  She is status post revascularization of lithotripsy on right side March 2024.  She did have significant issues postoperatively, family reports significant anemia and difficulty recovering from surgery.  She initially experienced relief and then had progressive symptoms and has been particularly impairing quality of life over the past several weeks.   -MRI brain without acute intracranial abnormality  -Vascular surgery consulted, planning for OR on Tuesday  -Continue aspirin, Crestor, ranolazine      Elevated serum creatinine, resolved  -Encourage p.o. intake      History of SIADH  -data deficit, Na currently normal, continue demeclocycline      Hypertension  -Continue with home amlodipine and Coreg     History of urinary retention  -Continue Flomax     Hypothyroidism  -Continue home Synthroid     GERD  -Continue home PPI     Depression  -Continue citalopram     Age-related cognitive impairment  -Continue donepezil     Declines blood products due to Christianity, okay with heparin    Expected Discharge Location and Transportation: TBD  Expected Discharge   Expected discharge date/ time has not been documented.     VTE Prophylaxis:  Pharmacologic VTE prophylaxis orders are present.         AM-PAC 6 Clicks Score (PT): 22 (02/27/25 2052)    CODE STATUS:   Code Status and Medical Interventions: CPR (Attempt to Resuscitate); Full Support   Ordered at: 02/25/25 8304     Level Of Support Discussed With:    Patient     Code Status (Patient has no pulse and is not breathing):    CPR (Attempt to Resuscitate)     Medical Interventions (Patient has pulse or is breathing):    Full Support       Bernardo Mejia, DO  02/28/25

## 2025-03-01 PROCEDURE — 25010000002 HEPARIN (PORCINE) PER 1000 UNITS: Performed by: STUDENT IN AN ORGANIZED HEALTH CARE EDUCATION/TRAINING PROGRAM

## 2025-03-01 PROCEDURE — 99232 SBSQ HOSP IP/OBS MODERATE 35: CPT | Performed by: INTERNAL MEDICINE

## 2025-03-01 RX ORDER — CARVEDILOL 3.12 MG/1
3.12 TABLET ORAL 2 TIMES DAILY WITH MEALS
Status: DISCONTINUED | OUTPATIENT
Start: 2025-03-02 | End: 2025-03-02

## 2025-03-01 RX ORDER — AMLODIPINE BESYLATE 5 MG/1
5 TABLET ORAL
Status: DISCONTINUED | OUTPATIENT
Start: 2025-03-02 | End: 2025-03-04

## 2025-03-01 RX ADMIN — Medication 10 ML: at 21:19

## 2025-03-01 RX ADMIN — Medication 1000 UNITS: at 09:50

## 2025-03-01 RX ADMIN — HEPARIN SODIUM 5000 UNITS: 5000 INJECTION INTRAVENOUS; SUBCUTANEOUS at 14:05

## 2025-03-01 RX ADMIN — RANOLAZINE 500 MG: 500 TABLET, FILM COATED, EXTENDED RELEASE ORAL at 21:17

## 2025-03-01 RX ADMIN — Medication 10 ML: at 09:51

## 2025-03-01 RX ADMIN — TAMSULOSIN HYDROCHLORIDE 0.4 MG: 0.4 CAPSULE ORAL at 21:17

## 2025-03-01 RX ADMIN — HEPARIN SODIUM 5000 UNITS: 5000 INJECTION INTRAVENOUS; SUBCUTANEOUS at 05:20

## 2025-03-01 RX ADMIN — ASPIRIN 81 MG CHEWABLE TABLET 81 MG: 81 TABLET CHEWABLE at 09:51

## 2025-03-01 RX ADMIN — CARVEDILOL 6.25 MG: 6.25 TABLET, FILM COATED ORAL at 16:51

## 2025-03-01 RX ADMIN — ROSUVASTATIN 20 MG: 20 TABLET, FILM COATED ORAL at 21:17

## 2025-03-01 RX ADMIN — CITALOPRAM HYDROBROMIDE 10 MG: 20 TABLET ORAL at 09:50

## 2025-03-01 RX ADMIN — DEMECLOCYCLINE HYDROCHLORIDE 150 MG: 150 TABLET, FILM COATED ORAL at 10:22

## 2025-03-01 RX ADMIN — RANOLAZINE 500 MG: 500 TABLET, FILM COATED, EXTENDED RELEASE ORAL at 09:51

## 2025-03-01 RX ADMIN — AMLODIPINE BESYLATE 10 MG: 10 TABLET ORAL at 09:50

## 2025-03-01 RX ADMIN — CARVEDILOL 6.25 MG: 6.25 TABLET, FILM COATED ORAL at 09:51

## 2025-03-01 RX ADMIN — PANTOPRAZOLE SODIUM 40 MG: 40 TABLET, DELAYED RELEASE ORAL at 05:20

## 2025-03-01 RX ADMIN — LEVOTHYROXINE SODIUM 50 MCG: 0.05 TABLET ORAL at 05:20

## 2025-03-01 RX ADMIN — DEMECLOCYCLINE HYDROCHLORIDE 150 MG: 150 TABLET, FILM COATED ORAL at 21:20

## 2025-03-01 RX ADMIN — DONEPEZIL HYDROCHLORIDE 5 MG: 5 TABLET ORAL at 21:17

## 2025-03-01 RX ADMIN — PANTOPRAZOLE SODIUM 40 MG: 40 TABLET, DELAYED RELEASE ORAL at 16:51

## 2025-03-01 RX ADMIN — HEPARIN SODIUM 5000 UNITS: 5000 INJECTION INTRAVENOUS; SUBCUTANEOUS at 21:17

## 2025-03-01 NOTE — PROGRESS NOTES
[facility] Medicine Services  PROGRESS NOTE    Patient Name: Jolanta Coppola  : 1940  MRN: [id number]    Date of Admission: 2025  Primary Care Physician: Benita Bruce APRN    Subjective   Subjective     CC:  Syncope, subclavian steal    HPI:  No syncope today; no chest pain; no palpitations      Objective   Objective     Vital Signs:   Temp:  [97.7 °F (36.5 °C)-98 °F (36.7 °C)] 98 °F (36.7 °C)  Heart Rate:  [51-66] 64  Resp:  [16] 16  BP: (106-128)/(59-90) 128/63     Alert, nontoxic sitting up in bed, normal respiratory effort, calm, comfortable appearing  [place]cat, oroph clear  Rrr  Lungs clear   Abd soft, nontende  No cce    Results Reviewed:  LAB RESULTS:      Lab 25  0024 25   WBC  --   --  6.51   HEMOGLOBIN  --   --  12.3   HEMATOCRIT  --   --  37.9   PLATELETS  --   --  282   NEUTROS ABS  --   --  3.43   IMMATURE GRANS (ABS)  --   --  0.03   LYMPHS ABS  --   --  2.13   MONOS ABS  --   --  0.65   EOS ABS  --   --  0.20   MCV  --   --  87.9   PROTIME  --   --  13.2   HSTROP T 13 17*  --          Lab 25  0516 25  0751 25   SODIUM  --  140  --   --  139   POTASSIUM  --  4.1  --   --  4.6   CHLORIDE  --  104  --   --  103   CO2  --  26.0  --   --  25.0   ANION GAP  --  10.0  --   --  11.0   BUN  --  13  --   --  18   CREATININE  --  0.81 1.20 1.20 1.13*   EGFR  --  70.8  --   --  47.5*   GLUCOSE  --  86  --   --  99   CALCIUM  --  8.9  --   --  8.9   MAGNESIUM 2.2 1.9  --   --   --    HEMOGLOBIN A1C  --   --   --   --  5.50   TSH  --   --   --   --  3.020         Lab 25   TOTAL PROTEIN 6.6   ALBUMIN 3.8   GLOBULIN 2.8   ALT (SGPT) 13   AST (SGOT) 26   BILIRUBIN 0.2   ALK PHOS 58         Lab 25  0024 25   HSTROP T 13 17*  --    PROTIME  --   --  13.2   INR  --   --  0.99         Lab 25   CHOLESTEROL 124   LDL CHOL 45   HDL CHOL 58    TRIGLYCERIDES 118             Brief Urine Lab Results       None            Microbiology Results Abnormal       None            No radiology results from the last 24 hrs        Current medications:  Scheduled Meds:amLODIPine, 10 mg, Oral, Q24H  aspirin, 81 mg, Oral, Daily  carvedilol, 6.25 mg, Oral, BID With Meals  cholecalciferol, 1,000 Units, Oral, Daily  citalopram, 10 mg, Oral, Daily  demeclocycline, 150 mg, Oral, Q12H  donepezil, 5 mg, Oral, Nightly  heparin (porcine), 5,000 Units, Subcutaneous, Q8H  levothyroxine, 50 mcg, Oral, Q AM  pantoprazole, 40 mg, Oral, BID AC  ranolazine, 500 mg, Oral, Q12H  rosuvastatin, 20 mg, Oral, Nightly  sodium chloride, 10 mL, Intravenous, Q12H  tamsulosin, 0.4 mg, Oral, Nightly      Continuous Infusions:   PRN Meds:.  acetaminophen **OR** acetaminophen **OR** acetaminophen    senna-docusate sodium **AND** polyethylene glycol **AND** bisacodyl **AND** bisacodyl    Calcium Replacement - Follow Nurse / BPA Driven Protocol    Magnesium Cardiology Dose Replacement - Follow Nurse / BPA Driven Protocol    Phosphorus Replacement - Follow Nurse / BPA Driven Protocol    Potassium Replacement - Follow Nurse / BPA Driven Protocol    [COMPLETED] Insert Peripheral IV **AND** sodium chloride    sodium chloride    sodium chloride    Assessment & Plan   Assessment & Plan     Active Hospital Problems    Diagnosis  POA    **Subclavian steal syndrome of right subclavian artery [G45.8]  Yes    Stroke [I63.9]  Yes    Carotid stenosis [I65.29]  Yes    Dyslipidemia [E78.5]  Yes    Episodic lightheadedness [R42]  Yes    Subclavian artery stenosis [I77.1]  Yes    Vertigo [R42]  Yes    Paraesophageal hiatal hernia [K44.9]  Yes    Hypothyroid [E03.9]  Yes    CAD (coronary artery disease) [I25.10]  Yes      Resolved Hospital Problems   No resolved problems to display.        Brief Hospital Course to date:  Jolanta Coppola is a 84 y.o. female with past medical history of coronary artery disease status post  remote CABG, multiple TIAs, carotid stenosis, hypothyroidism, SIADH, hyperlipidemia, GERD.  She also has a history of progressive subclavian steal syndrome.  Patient had progressively worsening increased frequency of lightheadedness, dizziness, and syncope with ambulation or standing up abruptly.  Vascular surgery consulted for subclavian steal syndrome, planning for OR on Tuesday.     Subclavian artery stenosis, w/ Subclavian Steal syndrome  Progressive near syncopal episodes  Carotid stenosis  History of TIA  History of coronary artery disease status post remote CABG  -Progressive symptoms, impairing quality of life and functional status.  She had followed with vascular surgery at [facility].  She is status post revascularization of lithotripsy on right side March 2024.  She did have significant issues postoperatively, family reports significant anemia and difficulty recovering from surgery.  She initially experienced relief and then had progressive symptoms and has been particularly impairing quality of life over the past several weeks.   -MRI brain without acute intracranial abnormality  -Vascular surgery consulted (Dr. Davis):  planning for OR on Tuesday 3/4; continue asa, statin, ranolazine      Elevated serum creatinine, resolved  -Encourage p.o. intake     History of SIADH  -Na currently normal, continue demeclocycline      Hypertension  -having some mild bradycardia and borderline hypotension at times  -decrease amlodipine to 5mg (from 10mg) and coreg to 3.125mg bid (from 6.25), monitor BP     History of urinary retention  -Continue Flomax     Hypothyroidism  -Continue home Synthroid     GERD  -Continue home PPI     Depression  -Continue citalopram     Age-related cognitive impairment  -Continue donepezil     Declines blood products due to Faith, okay with heparin. I plan to clarify w/ patient       Am labs: cbc,bmp,mag    Expected Discharge Location and Transportation: TBD  Expected Discharge    Expected discharge date/ time has not been documented.     VTE Prophylaxis:  Pharmacologic VTE prophylaxis orders are present.         AM-PAC 6 Clicks Score (PT): 22 (02/28/25 2000)    CODE STATUS:   Code Status and Medical Interventions: CPR (Attempt to Resuscitate); Full Support   Ordered at: 02/25/25 9537     Level Of Support Discussed With:    Patient     Code Status (Patient has no pulse and is not breathing):    CPR (Attempt to Resuscitate)     Medical Interventions (Patient has pulse or is breathing):    Full Support       Jarvis Scruggs MD  03/01/25

## 2025-03-01 NOTE — PLAN OF CARE
Goal Outcome Evaluation:      No significant events overnight. Patient slept well. New IV placed. VSS on RA. A&Ox4. Ambulates to the bathroom with assistx1

## 2025-03-01 NOTE — PLAN OF CARE
Goal Outcome Evaluation:  Plan of Care Reviewed With: patient        Progress: no change        No changes this shift, AOx4, VSS, RA, no c/o of pain, family at bedside      Problem: Adult Inpatient Plan of Care  Goal: Absence of Hospital-Acquired Illness or Injury  Intervention: Identify and Manage Fall Risk  Description: Perform standard risk assessment on admission using a validated tool or comprehensive approach appropriate to the patient; reassess fall risk frequently, with change in status or transfer to another level of care.  Communicate risk to interprofessional healthcare team; ensure fall risk visible cue.  Determine need for increased observation, equipment and environmental modification, as well as use of supportive, nonskid footwear.  Adjust safety measures to individual needs and identified risk factors.  Reinforce the importance of active participation with fall risk prevention, safety, and physical activity with the patient and family.  Perform regular intentional rounding to assess need for position change, pain assessment and personal needs, including assistance with toileting.  Recent Flowsheet Documentation  Taken 3/1/2025 1836 by Padmaja Roger, RN  Safety Promotion/Fall Prevention:   safety round/check completed   toileting scheduled   room organization consistent   nonskid shoes/slippers when out of bed   mobility aid in reach   lighting adjusted   gait belt   fall prevention program maintained   assistive device/personal items within reach   activity supervised   elopement precautions   clutter free environment maintained  Taken 3/1/2025 1652 by Padmaja Roger, RN  Safety Promotion/Fall Prevention:   safety round/check completed   toileting scheduled   room organization consistent   muscle strengthening facilitated   nonskid shoes/slippers when out of bed   mobility aid in reach   lighting adjusted   gait belt   fall prevention program maintained   elopement precautions   clutter free  environment maintained   assistive device/personal items within reach   activity supervised  Taken 3/1/2025 1400 by Padmaja Roger, RN  Safety Promotion/Fall Prevention:   safety round/check completed   room organization consistent   nonskid shoes/slippers when out of bed   muscle strengthening facilitated   mobility aid in reach   gait belt   lighting adjusted   fall prevention program maintained   elopement precautions   clutter free environment maintained   activity supervised   assistive device/personal items within reach  Taken 3/1/2025 1200 by Padmaja Roger, RN  Safety Promotion/Fall Prevention:   safety round/check completed   toileting scheduled   room organization consistent   muscle strengthening facilitated   nonskid shoes/slippers when out of bed   mobility aid in reach   lighting adjusted   gait belt   elopement precautions   fall prevention program maintained   clutter free environment maintained   activity supervised   assistive device/personal items within reach  Taken 3/1/2025 1000 by Padmaja Roger, RN  Safety Promotion/Fall Prevention:   safety round/check completed   room organization consistent   nonskid shoes/slippers when out of bed   muscle strengthening facilitated   mobility aid in reach   lighting adjusted   gait belt   elopement precautions   fall prevention program maintained   clutter free environment maintained   assistive device/personal items within reach   activity supervised  Taken 3/1/2025 0950 by Padmaja Roger, RN  Safety Promotion/Fall Prevention:   safety round/check completed   toileting scheduled   room organization consistent   muscle strengthening facilitated   nonskid shoes/slippers when out of bed   lighting adjusted   mobility aid in reach   gait belt   fall prevention program maintained   elopement precautions   clutter free environment maintained   assistive device/personal items within reach   activity supervised

## 2025-03-02 ENCOUNTER — APPOINTMENT (OUTPATIENT)
Dept: CARDIOLOGY | Facility: HOSPITAL | Age: 85
End: 2025-03-02
Payer: MEDICARE

## 2025-03-02 LAB
ANION GAP SERPL CALCULATED.3IONS-SCNC: 12 MMOL/L (ref 5–15)
ASCENDING AORTA: 3.1 CM
AV MEAN PRESS GRAD SYS DOP V1V2: 5.5 MMHG
AV VMAX SYS DOP: 161 CM/SEC
BH CV ECHO MEAS - AO MAX PG: 10.4 MMHG
BH CV ECHO MEAS - AO ROOT DIAM: 2.1 CM
BH CV ECHO MEAS - AO V2 VTI: 39.6 CM
BH CV ECHO MEAS - AVA(I,D): 2.04 CM2
BH CV ECHO MEAS - EDV(CUBED): 74.1 ML
BH CV ECHO MEAS - EDV(MOD-SP2): 51.9 ML
BH CV ECHO MEAS - EDV(MOD-SP4): 55.2 ML
BH CV ECHO MEAS - EF(MOD-SP2): 68.6 %
BH CV ECHO MEAS - EF(MOD-SP4): 62.5 %
BH CV ECHO MEAS - ESV(CUBED): 22 ML
BH CV ECHO MEAS - ESV(MOD-SP2): 16.3 ML
BH CV ECHO MEAS - ESV(MOD-SP4): 20.7 ML
BH CV ECHO MEAS - FS: 33.3 %
BH CV ECHO MEAS - IVS/LVPW: 1.1 CM
BH CV ECHO MEAS - IVSD: 1.1 CM
BH CV ECHO MEAS - LA DIMENSION: 4 CM
BH CV ECHO MEAS - LAT PEAK E' VEL: 10.7 CM/SEC
BH CV ECHO MEAS - LV DIASTOLIC VOL/BSA (35-75): 32 CM2
BH CV ECHO MEAS - LV MASS(C)D: 147 GRAMS
BH CV ECHO MEAS - LV MAX PG: 4.3 MMHG
BH CV ECHO MEAS - LV MEAN PG: 2 MMHG
BH CV ECHO MEAS - LV SYSTOLIC VOL/BSA (12-30): 12 CM2
BH CV ECHO MEAS - LV V1 MAX: 103.5 CM/SEC
BH CV ECHO MEAS - LV V1 VTI: 25.7 CM
BH CV ECHO MEAS - LVIDD: 4.2 CM
BH CV ECHO MEAS - LVIDS: 2.8 CM
BH CV ECHO MEAS - LVOT AREA: 3.1 CM2
BH CV ECHO MEAS - LVOT DIAM: 2 CM
BH CV ECHO MEAS - LVPWD: 1 CM
BH CV ECHO MEAS - MED PEAK E' VEL: 9 CM/SEC
BH CV ECHO MEAS - MV A MAX VEL: 92.8 CM/SEC
BH CV ECHO MEAS - MV DEC SLOPE: 461 CM/SEC2
BH CV ECHO MEAS - MV DEC TIME: 0.26 SEC
BH CV ECHO MEAS - MV E MAX VEL: 85.9 CM/SEC
BH CV ECHO MEAS - MV E/A: 0.93
BH CV ECHO MEAS - MV MAX PG: 6 MMHG
BH CV ECHO MEAS - MV MEAN PG: 2 MMHG
BH CV ECHO MEAS - MV P1/2T: 80.7 MSEC
BH CV ECHO MEAS - MV V2 VTI: 47.5 CM
BH CV ECHO MEAS - MVA(P1/2T): 2.7 CM2
BH CV ECHO MEAS - MVA(VTI): 1.7 CM2
BH CV ECHO MEAS - PA ACC TIME: 0.14 SEC
BH CV ECHO MEAS - PI END-D VEL: 93.1 CM/SEC
BH CV ECHO MEAS - SV(LVOT): 80.7 ML
BH CV ECHO MEAS - SV(MOD-SP2): 35.6 ML
BH CV ECHO MEAS - SV(MOD-SP4): 34.5 ML
BH CV ECHO MEAS - SVI(LVOT): 46.8 ML/M2
BH CV ECHO MEAS - SVI(MOD-SP2): 20.6 ML/M2
BH CV ECHO MEAS - SVI(MOD-SP4): 20 ML/M2
BH CV ECHO MEAS - TAPSE (>1.6): 1.96 CM
BH CV ECHO MEAS - TR MAX PG: 25.7 MMHG
BH CV ECHO MEAS - TR MAX VEL: 249.5 CM/SEC
BH CV ECHO MEASUREMENTS AVERAGE E/E' RATIO: 8.72
BH CV VAS BP RIGHT ARM: NORMAL MMHG
BH CV XLRA - RV BASE: 2.5 CM
BH CV XLRA - RV LENGTH: 5.1 CM
BH CV XLRA - RV MID: 2.3 CM
BH CV XLRA - TDI S': 9.8 CM/SEC
BUN SERPL-MCNC: 13 MG/DL (ref 8–23)
BUN/CREAT SERPL: 18.1 (ref 7–25)
CALCIUM SPEC-SCNC: 8.7 MG/DL (ref 8.6–10.5)
CHLORIDE SERPL-SCNC: 102 MMOL/L (ref 98–107)
CO2 SERPL-SCNC: 25 MMOL/L (ref 22–29)
CREAT SERPL-MCNC: 0.72 MG/DL (ref 0.57–1)
DEPRECATED RDW RBC AUTO: 44.3 FL (ref 37–54)
EGFRCR SERPLBLD CKD-EPI 2021: 81.5 ML/MIN/1.73
ERYTHROCYTE [DISTWIDTH] IN BLOOD BY AUTOMATED COUNT: 14 % (ref 12.3–15.4)
GLUCOSE BLDC GLUCOMTR-MCNC: 105 MG/DL (ref 70–130)
GLUCOSE SERPL-MCNC: 113 MG/DL (ref 65–99)
HCT VFR BLD AUTO: 35 % (ref 34–46.6)
HGB BLD-MCNC: 11.4 G/DL (ref 12–15.9)
LEFT ATRIUM VOLUME INDEX: 34.9 ML/M2
LV EF 2D ECHO EST: 65 %
LV EF BIPLANE MOD: 65.7 %
MAGNESIUM SERPL-MCNC: 1.8 MG/DL (ref 1.6–2.4)
MCH RBC QN AUTO: 28.4 PG (ref 26.6–33)
MCHC RBC AUTO-ENTMCNC: 32.6 G/DL (ref 31.5–35.7)
MCV RBC AUTO: 87.1 FL (ref 79–97)
PLATELET # BLD AUTO: 224 10*3/MM3 (ref 140–450)
PMV BLD AUTO: 11.7 FL (ref 6–12)
POTASSIUM SERPL-SCNC: 4.2 MMOL/L (ref 3.5–5.2)
QT INTERVAL: 422 MS
QTC INTERVAL: 417 MS
RBC # BLD AUTO: 4.02 10*6/MM3 (ref 3.77–5.28)
SODIUM SERPL-SCNC: 139 MMOL/L (ref 136–145)
WBC NRBC COR # BLD AUTO: 5.45 10*3/MM3 (ref 3.4–10.8)

## 2025-03-02 PROCEDURE — 80048 BASIC METABOLIC PNL TOTAL CA: CPT | Performed by: INTERNAL MEDICINE

## 2025-03-02 PROCEDURE — 93306 TTE W/DOPPLER COMPLETE: CPT

## 2025-03-02 PROCEDURE — 25010000002 MAGNESIUM SULFATE 4 GM/100ML SOLUTION: Performed by: INTERNAL MEDICINE

## 2025-03-02 PROCEDURE — 82948 REAGENT STRIP/BLOOD GLUCOSE: CPT

## 2025-03-02 PROCEDURE — 85027 COMPLETE CBC AUTOMATED: CPT | Performed by: INTERNAL MEDICINE

## 2025-03-02 PROCEDURE — 25010000002 HEPARIN (PORCINE) PER 1000 UNITS: Performed by: STUDENT IN AN ORGANIZED HEALTH CARE EDUCATION/TRAINING PROGRAM

## 2025-03-02 PROCEDURE — 93306 TTE W/DOPPLER COMPLETE: CPT | Performed by: INTERNAL MEDICINE

## 2025-03-02 PROCEDURE — 25810000003 LACTATED RINGERS PER 1000 ML: Performed by: INTERNAL MEDICINE

## 2025-03-02 PROCEDURE — 93010 ELECTROCARDIOGRAM REPORT: CPT | Performed by: STUDENT IN AN ORGANIZED HEALTH CARE EDUCATION/TRAINING PROGRAM

## 2025-03-02 PROCEDURE — 83735 ASSAY OF MAGNESIUM: CPT | Performed by: INTERNAL MEDICINE

## 2025-03-02 PROCEDURE — 99232 SBSQ HOSP IP/OBS MODERATE 35: CPT | Performed by: INTERNAL MEDICINE

## 2025-03-02 PROCEDURE — 93005 ELECTROCARDIOGRAM TRACING: CPT | Performed by: INTERNAL MEDICINE

## 2025-03-02 RX ORDER — CARVEDILOL 3.12 MG/1
3.12 TABLET ORAL 2 TIMES DAILY WITH MEALS
Status: DISCONTINUED | OUTPATIENT
Start: 2025-03-03 | End: 2025-03-05 | Stop reason: HOSPADM

## 2025-03-02 RX ORDER — SODIUM CHLORIDE, SODIUM LACTATE, POTASSIUM CHLORIDE, CALCIUM CHLORIDE 600; 310; 30; 20 MG/100ML; MG/100ML; MG/100ML; MG/100ML
75 INJECTION, SOLUTION INTRAVENOUS CONTINUOUS
Status: ACTIVE | OUTPATIENT
Start: 2025-03-02 | End: 2025-03-03

## 2025-03-02 RX ORDER — MAGNESIUM SULFATE HEPTAHYDRATE 40 MG/ML
4 INJECTION, SOLUTION INTRAVENOUS ONCE
Status: COMPLETED | OUTPATIENT
Start: 2025-03-02 | End: 2025-03-02

## 2025-03-02 RX ADMIN — CARVEDILOL 3.12 MG: 3.12 TABLET, FILM COATED ORAL at 09:28

## 2025-03-02 RX ADMIN — Medication 1000 UNITS: at 09:28

## 2025-03-02 RX ADMIN — DONEPEZIL HYDROCHLORIDE 5 MG: 5 TABLET ORAL at 21:12

## 2025-03-02 RX ADMIN — ASPIRIN 81 MG CHEWABLE TABLET 81 MG: 81 TABLET CHEWABLE at 09:28

## 2025-03-02 RX ADMIN — HEPARIN SODIUM 5000 UNITS: 5000 INJECTION INTRAVENOUS; SUBCUTANEOUS at 21:10

## 2025-03-02 RX ADMIN — PANTOPRAZOLE SODIUM 40 MG: 40 TABLET, DELAYED RELEASE ORAL at 16:52

## 2025-03-02 RX ADMIN — MAGNESIUM SULFATE IN WATER FOR 4 G: 40 INJECTION INTRAVENOUS at 12:55

## 2025-03-02 RX ADMIN — HEPARIN SODIUM 5000 UNITS: 5000 INJECTION INTRAVENOUS; SUBCUTANEOUS at 13:03

## 2025-03-02 RX ADMIN — ROSUVASTATIN 20 MG: 20 TABLET, FILM COATED ORAL at 20:57

## 2025-03-02 RX ADMIN — Medication 10 ML: at 09:28

## 2025-03-02 RX ADMIN — DEMECLOCYCLINE HYDROCHLORIDE 150 MG: 150 TABLET, FILM COATED ORAL at 20:57

## 2025-03-02 RX ADMIN — SODIUM CHLORIDE, POTASSIUM CHLORIDE, SODIUM LACTATE AND CALCIUM CHLORIDE 75 ML/HR: 600; 310; 30; 20 INJECTION, SOLUTION INTRAVENOUS at 11:39

## 2025-03-02 RX ADMIN — CITALOPRAM HYDROBROMIDE 10 MG: 20 TABLET ORAL at 09:28

## 2025-03-02 RX ADMIN — PANTOPRAZOLE SODIUM 40 MG: 40 TABLET, DELAYED RELEASE ORAL at 09:28

## 2025-03-02 RX ADMIN — DEMECLOCYCLINE HYDROCHLORIDE 150 MG: 150 TABLET, FILM COATED ORAL at 09:28

## 2025-03-02 RX ADMIN — RANOLAZINE 500 MG: 500 TABLET, FILM COATED, EXTENDED RELEASE ORAL at 20:58

## 2025-03-02 RX ADMIN — HEPARIN SODIUM 5000 UNITS: 5000 INJECTION INTRAVENOUS; SUBCUTANEOUS at 05:30

## 2025-03-02 RX ADMIN — LEVOTHYROXINE SODIUM 50 MCG: 0.05 TABLET ORAL at 05:31

## 2025-03-02 RX ADMIN — Medication 10 ML: at 20:57

## 2025-03-02 RX ADMIN — AMLODIPINE BESYLATE 5 MG: 5 TABLET ORAL at 09:28

## 2025-03-02 RX ADMIN — TAMSULOSIN HYDROCHLORIDE 0.4 MG: 0.4 CAPSULE ORAL at 20:58

## 2025-03-02 RX ADMIN — RANOLAZINE 500 MG: 500 TABLET, FILM COATED, EXTENDED RELEASE ORAL at 09:28

## 2025-03-02 NOTE — PLAN OF CARE
Problem: Adult Inpatient Plan of Care  Goal: Absence of Hospital-Acquired Illness or Injury  Outcome: Progressing  Intervention: Identify and Manage Fall Risk  Recent Flowsheet Documentation  Taken 3/2/2025 0420 by Carla Mohr RN  Safety Promotion/Fall Prevention: safety round/check completed  Taken 3/1/2025 2030 by Carla Mohr RN  Safety Promotion/Fall Prevention:   assistive device/personal items within reach   clutter free environment maintained   fall prevention program maintained   room organization consistent   safety round/check completed  Intervention: Prevent Skin Injury  Recent Flowsheet Documentation  Taken 3/2/2025 0420 by Carla Mohr RN  Body Position: position maintained  Taken 3/2/2025 0020 by Carla Mohr RN  Body Position: position maintained  Taken 3/1/2025 2030 by Carla Mohr RN  Body Position: position changed independently  Intervention: Prevent and Manage VTE (Venous Thromboembolism) Risk  Recent Flowsheet Documentation  Taken 3/1/2025 2030 by Carla Mohr RN  VTE Prevention/Management: (heparin) other (see comments)  Intervention: Prevent Infection  Recent Flowsheet Documentation  Taken 3/1/2025 2030 by Carla Mohr RN  Infection Prevention:   cohorting utilized   environmental surveillance performed   rest/sleep promoted   single patient room provided   Goal Outcome Evaluation:

## 2025-03-02 NOTE — PROGRESS NOTES
"    [facility] Medicine Services  PROGRESS NOTE    Patient Name: Jolanta Coppola  : 1940  MRN: [id number]    Date of Admission: 2025  Primary Care Physician: Benita Bruce APRN    Subjective   Subjective     CC:  Syncope, subclavian steal    HPI:  A bit \"woozy\"/\"dizzy\" spell this morning. Feeling better currently, occurred while sitting in bed. No chest pain or palpitations. No n/v/d. No ear fullness, no tinnitus, no sensation of movement      Objective   Objective     Vital Signs:   Temp:  [97.9 °F (36.6 °C)-98.4 °F (36.9 °C)] 98.1 °F (36.7 °C)  Heart Rate:  [57-64] 58  Resp:  [16-20] 18  BP: (112-130)/(63-83) 130/83     Alert, nontoxic sitting up in bed, normal respiratory effort, calm, comfortable appearing  Ncat, oroph clear  Rrr  Lungs clear   Abd soft, nontender  Face symmetric, speech clear, equal , strength symmetric, in all extremities  No cce    Results Reviewed:  LAB RESULTS:      Lab 25  0024 25   WBC  --   --  6.51   HEMOGLOBIN  --   --  12.3   HEMATOCRIT  --   --  37.9   PLATELETS  --   --  282   NEUTROS ABS  --   --  3.43   IMMATURE GRANS (ABS)  --   --  0.03   LYMPHS ABS  --   --  2.13   MONOS ABS  --   --  0.65   EOS ABS  --   --  0.20   MCV  --   --  87.9   PROTIME  --   --  13.2   HSTROP T 13 17*  --          Lab 25  0516 25  0751 25   SODIUM  --  140  --   --  139   POTASSIUM  --  4.1  --   --  4.6   CHLORIDE  --  104  --   --  103   CO2  --  26.0  --   --  25.0   ANION GAP  --  10.0  --   --  11.0   BUN  --  13  --   --  18   CREATININE  --  0.81 1.20 1.20 1.13*   EGFR  --  70.8  --   --  47.5*   GLUCOSE  --  86  --   --  99   CALCIUM  --  8.9  --   --  8.9   MAGNESIUM 2.2 1.9  --   --   --    HEMOGLOBIN A1C  --   --   --   --  5.50   TSH  --   --   --   --  3.020         Lab 254   TOTAL PROTEIN 6.6   ALBUMIN 3.8   GLOBULIN 2.8   ALT (SGPT) 13   AST " (SGOT) 26   BILIRUBIN 0.2   ALK PHOS 58         Lab 02/26/25  0024 02/25/25  2143 02/25/25 2044   HSTROP T 13 17*  --    PROTIME  --   --  13.2   INR  --   --  0.99         Lab 02/25/25 2143   CHOLESTEROL 124   LDL CHOL 45   HDL CHOL 58   TRIGLYCERIDES 118             Brief Urine Lab Results       None            Microbiology Results Abnormal       None            No radiology results from the last 24 hrs        Current medications:  Scheduled Meds:amLODIPine, 5 mg, Oral, Q24H  aspirin, 81 mg, Oral, Daily  carvedilol, 3.125 mg, Oral, BID With Meals  cholecalciferol, 1,000 Units, Oral, Daily  citalopram, 10 mg, Oral, Daily  demeclocycline, 150 mg, Oral, Q12H  donepezil, 5 mg, Oral, Nightly  heparin (porcine), 5,000 Units, Subcutaneous, Q8H  levothyroxine, 50 mcg, Oral, Q AM  pantoprazole, 40 mg, Oral, BID AC  ranolazine, 500 mg, Oral, Q12H  rosuvastatin, 20 mg, Oral, Nightly  sodium chloride, 10 mL, Intravenous, Q12H  tamsulosin, 0.4 mg, Oral, Nightly      Continuous Infusions:   PRN Meds:.  acetaminophen **OR** acetaminophen **OR** acetaminophen    senna-docusate sodium **AND** polyethylene glycol **AND** bisacodyl **AND** bisacodyl    Calcium Replacement - Follow Nurse / BPA Driven Protocol    Magnesium Cardiology Dose Replacement - Follow Nurse / BPA Driven Protocol    Phosphorus Replacement - Follow Nurse / BPA Driven Protocol    Potassium Replacement - Follow Nurse / BPA Driven Protocol    [COMPLETED] Insert Peripheral IV **AND** sodium chloride    sodium chloride    sodium chloride    Assessment & Plan   Assessment & Plan     Active Hospital Problems    Diagnosis  POA    **Subclavian steal syndrome of right subclavian artery [G45.8]  Yes    Stroke [I63.9]  Yes    Carotid stenosis [I65.29]  Yes    Dyslipidemia [E78.5]  Yes    Episodic lightheadedness [R42]  Yes    Subclavian artery stenosis [I77.1]  Yes    Vertigo [R42]  Yes    Paraesophageal hiatal hernia [K44.9]  Yes    Hypothyroid [E03.9]  Yes    CAD  (coronary artery disease) [I25.10]  Yes      Resolved Hospital Problems   No resolved problems to display.        Brief Hospital Course to date:  Jolanta Coppola is a 84 y.o. female with past medical history of coronary artery disease status post remote CABG, multiple TIAs, carotid stenosis, hypothyroidism, SIADH, hyperlipidemia, GERD.  She also has a history of progressive subclavian steal syndrome.  Patient had progressively worsening increased frequency of lightheadedness, dizziness, and syncope with ambulation or standing up abruptly.  Vascular surgery consulted for subclavian steal syndrome, planning for OR on Tuesday.     Subclavian artery stenosis, w/ Subclavian Steal syndrome  Progressive near syncopal episodes  Carotid stenosis  History of TIA  History of coronary artery disease status post remote CABG  -Progressive symptoms, impairing quality of life and functional status.  She had followed with vascular surgery at [facility].  She is status post revascularization of lithotripsy on right side March 2024.  She did have significant issues postoperatively, family reports significant anemia and difficulty recovering from surgery.  She initially experienced relief and then had progressive symptoms and has been particularly impairing quality of life over the past several weeks.   -MRI brain without acute intracranial abnormality  -Vascular surgery consulted (Dr. Davis):  planning for OR on Tuesday 3/4; continue asa, statin, ranolazine  -3/2/25: spell of dizziness/wooziness this morning;EKG with sinus bradycardia, nonspecific st flattening (unchanged from prior); echo ok; orthostatics were + (sbp lying 134, sitting 118, standing 98). EKG sinus bradycardia, nonspecific st flattening but unchanged from previous. Will give isotonic fluids 75cc/hr x 1 liter; recheck in a.m.; hold tonight's coreg dose (decreased the dose yesterday)    Elevated serum creatinine, resolved  -Encourage p.o. intake     History of  SIADH  -Na currently normal, continue demeclocycline   -today's labs pending     Hypertension  -having some mild bradycardia and borderline hypotension at times  -on 3/1/25 decreased amlodipine to 5mg (from 10mg) and coreg to 3.125mg bid (from 6.25), monitor BP     History of urinary retention  -Continue Flomax     Hypothyroidism  -Continue home Synthroid     GERD  -Continue home PPI     Depression  -Continue citalopram     Age-related cognitive impairment  -Continue donepezil     Declines blood products due to Voodoo, okay with heparin. I plan to clarify w/ patient       Am labs: cbc,bmp,mag    Expected Discharge Location and Transportation: TBD  Expected Discharge   Expected discharge date/ time has not been documented.     VTE Prophylaxis:  Pharmacologic VTE prophylaxis orders are present.         AM-PAC 6 Clicks Score (PT): 22 (03/01/25 2030)    CODE STATUS:   Code Status and Medical Interventions: CPR (Attempt to Resuscitate); Full Support   Ordered at: 02/25/25 5565     Level Of Support Discussed With:    Patient     Code Status (Patient has no pulse and is not breathing):    CPR (Attempt to Resuscitate)     Medical Interventions (Patient has pulse or is breathing):    Full Support       Jarvis Scruggs MD  03/02/25

## 2025-03-03 LAB
ANION GAP SERPL CALCULATED.3IONS-SCNC: 9 MMOL/L (ref 5–15)
BUN SERPL-MCNC: 15 MG/DL (ref 8–23)
BUN/CREAT SERPL: 16.1 (ref 7–25)
CALCIUM SPEC-SCNC: 8.6 MG/DL (ref 8.6–10.5)
CHLORIDE SERPL-SCNC: 102 MMOL/L (ref 98–107)
CO2 SERPL-SCNC: 28 MMOL/L (ref 22–29)
CREAT SERPL-MCNC: 0.93 MG/DL (ref 0.57–1)
DEPRECATED RDW RBC AUTO: 45.6 FL (ref 37–54)
EGFRCR SERPLBLD CKD-EPI 2021: 60 ML/MIN/1.73
ERYTHROCYTE [DISTWIDTH] IN BLOOD BY AUTOMATED COUNT: 14.2 % (ref 12.3–15.4)
GLUCOSE SERPL-MCNC: 108 MG/DL (ref 65–99)
HCT VFR BLD AUTO: 33.6 % (ref 34–46.6)
HGB BLD-MCNC: 10.7 G/DL (ref 12–15.9)
MAGNESIUM SERPL-MCNC: 2 MG/DL (ref 1.6–2.4)
MCH RBC QN AUTO: 28.2 PG (ref 26.6–33)
MCHC RBC AUTO-ENTMCNC: 31.8 G/DL (ref 31.5–35.7)
MCV RBC AUTO: 88.7 FL (ref 79–97)
PLATELET # BLD AUTO: 222 10*3/MM3 (ref 140–450)
PMV BLD AUTO: 11.5 FL (ref 6–12)
POTASSIUM SERPL-SCNC: 4.6 MMOL/L (ref 3.5–5.2)
RBC # BLD AUTO: 3.79 10*6/MM3 (ref 3.77–5.28)
SODIUM SERPL-SCNC: 139 MMOL/L (ref 136–145)
WBC NRBC COR # BLD AUTO: 5.81 10*3/MM3 (ref 3.4–10.8)

## 2025-03-03 PROCEDURE — 99232 SBSQ HOSP IP/OBS MODERATE 35: CPT | Performed by: INTERNAL MEDICINE

## 2025-03-03 PROCEDURE — 25010000002 HEPARIN (PORCINE) PER 1000 UNITS: Performed by: STUDENT IN AN ORGANIZED HEALTH CARE EDUCATION/TRAINING PROGRAM

## 2025-03-03 PROCEDURE — 83735 ASSAY OF MAGNESIUM: CPT | Performed by: INTERNAL MEDICINE

## 2025-03-03 PROCEDURE — 80048 BASIC METABOLIC PNL TOTAL CA: CPT | Performed by: INTERNAL MEDICINE

## 2025-03-03 PROCEDURE — 85027 COMPLETE CBC AUTOMATED: CPT | Performed by: INTERNAL MEDICINE

## 2025-03-03 RX ADMIN — SENNOSIDES AND DOCUSATE SODIUM 2 TABLET: 50; 8.6 TABLET ORAL at 08:31

## 2025-03-03 RX ADMIN — RANOLAZINE 500 MG: 500 TABLET, FILM COATED, EXTENDED RELEASE ORAL at 08:31

## 2025-03-03 RX ADMIN — CARVEDILOL 3.12 MG: 3.12 TABLET, FILM COATED ORAL at 16:45

## 2025-03-03 RX ADMIN — Medication 1000 UNITS: at 08:31

## 2025-03-03 RX ADMIN — RANOLAZINE 500 MG: 500 TABLET, FILM COATED, EXTENDED RELEASE ORAL at 21:18

## 2025-03-03 RX ADMIN — ROSUVASTATIN 20 MG: 20 TABLET, FILM COATED ORAL at 21:18

## 2025-03-03 RX ADMIN — ASPIRIN 81 MG CHEWABLE TABLET 81 MG: 81 TABLET CHEWABLE at 08:31

## 2025-03-03 RX ADMIN — DONEPEZIL HYDROCHLORIDE 5 MG: 5 TABLET ORAL at 21:18

## 2025-03-03 RX ADMIN — LEVOTHYROXINE SODIUM 50 MCG: 0.05 TABLET ORAL at 05:35

## 2025-03-03 RX ADMIN — PANTOPRAZOLE SODIUM 40 MG: 40 TABLET, DELAYED RELEASE ORAL at 08:31

## 2025-03-03 RX ADMIN — HEPARIN SODIUM 5000 UNITS: 5000 INJECTION INTRAVENOUS; SUBCUTANEOUS at 05:35

## 2025-03-03 RX ADMIN — CITALOPRAM HYDROBROMIDE 10 MG: 20 TABLET ORAL at 08:31

## 2025-03-03 RX ADMIN — Medication 10 ML: at 08:32

## 2025-03-03 RX ADMIN — AMLODIPINE BESYLATE 5 MG: 5 TABLET ORAL at 08:31

## 2025-03-03 RX ADMIN — PANTOPRAZOLE SODIUM 40 MG: 40 TABLET, DELAYED RELEASE ORAL at 16:46

## 2025-03-03 RX ADMIN — TAMSULOSIN HYDROCHLORIDE 0.4 MG: 0.4 CAPSULE ORAL at 21:18

## 2025-03-03 RX ADMIN — CARVEDILOL 3.12 MG: 3.12 TABLET, FILM COATED ORAL at 08:31

## 2025-03-03 RX ADMIN — DEMECLOCYCLINE HYDROCHLORIDE 150 MG: 150 TABLET, FILM COATED ORAL at 08:31

## 2025-03-03 RX ADMIN — DEMECLOCYCLINE HYDROCHLORIDE 150 MG: 150 TABLET, FILM COATED ORAL at 21:18

## 2025-03-03 RX ADMIN — Medication 10 ML: at 21:18

## 2025-03-03 NOTE — PROGRESS NOTES
[facility] Medicine Services  PROGRESS NOTE    Patient Name: Jolanta Coppola  : 1940  MRN: [id number]    Date of Admission: 2025  Primary Care Physician: Benita Bruce APRN    Subjective   Subjective     CC:  Syncope, subclavian steal    HPI:  Feeling ok today, no dpisodes of dizziness or dyspnea. No chest pain      Objective   Objective     Vital Signs:   Temp:  [97 °F (36.1 °C)-98.2 °F (36.8 °C)] 98 °F (36.7 °C)  Heart Rate:  [50-70] 70  Resp:  [18-19] 18  BP: (144-161)/(52-92) 144/82     Alert, nontoxic sitting up in bed, normal respiratory effort, calm, comfortable appearing  Ncat, oroph clear  rrr  Ctab, normal resp effort   Abd soft, nontender to palpation  Face symmetric, speech clear, equal , strength symmetric, in all extremities  No cce    Results Reviewed:  LAB RESULTS:      Lab 25  1043 254 25   WBC 5.45  --   --  6.51   HEMOGLOBIN 11.4*  --   --  12.3   HEMATOCRIT 35.0  --   --  37.9   PLATELETS 224  --   --  282   NEUTROS ABS  --   --   --  3.43   IMMATURE GRANS (ABS)  --   --   --  0.03   LYMPHS ABS  --   --   --  2.13   MONOS ABS  --   --   --  0.65   EOS ABS  --   --   --  0.20   MCV 87.1  --   --  87.9   PROTIME  --   --   --  13.2   HSTROP T  --  13 17*  --          Lab 25  1043 25  0516 25  0751 25   SODIUM 139  --  140  --   --  139   POTASSIUM 4.2  --  4.1  --   --  4.6   CHLORIDE 102  --  104  --   --  103   CO2 25.0  --  26.0  --   --  25.0   ANION GAP 12.0  --  10.0  --   --  11.0   BUN 13  --  13  --   --  18   CREATININE 0.72  --  0.81 1.20 1.20 1.13*   EGFR 81.5  --  70.8  --   --  47.5*   GLUCOSE 113*  --  86  --   --  99   CALCIUM 8.7  --  8.9  --   --  8.9   MAGNESIUM 1.8 2.2 1.9  --   --   --    HEMOGLOBIN A1C  --   --   --   --   --  5.50   TSH  --   --   --   --   --  3.020         Lab 254   TOTAL PROTEIN 6.6   ALBUMIN  3.8   GLOBULIN 2.8   ALT (SGPT) 13   AST (SGOT) 26   BILIRUBIN 0.2   ALK PHOS 58         Lab 02/26/25  0024 02/25/25  2143 02/25/25 2044   HSTROP T 13 17*  --    PROTIME  --   --  13.2   INR  --   --  0.99         Lab 02/25/25  2143   CHOLESTEROL 124   LDL CHOL 45   HDL CHOL 58   TRIGLYCERIDES 118             Brief Urine Lab Results       None            Microbiology Results Abnormal       None            No radiology results from the last 24 hrs    Results for orders placed during the hospital encounter of 02/25/25    Adult Transthoracic Echo Complete W/ Cont if Necessary Per Protocol    Interpretation Summary    Left ventricular systolic function is normal. Estimated left ventricular EF = 65%    Left ventricular wall thickness is consistent with mild concentric hypertrophy.    No hemodynamically significant valvular heart disease      Current medications:  Scheduled Meds:amLODIPine, 5 mg, Oral, Q24H  aspirin, 81 mg, Oral, Daily  carvedilol, 3.125 mg, Oral, BID With Meals  cholecalciferol, 1,000 Units, Oral, Daily  citalopram, 10 mg, Oral, Daily  demeclocycline, 150 mg, Oral, Q12H  donepezil, 5 mg, Oral, Nightly  [Held by provider] heparin (porcine), 5,000 Units, Subcutaneous, Q8H  levothyroxine, 50 mcg, Oral, Q AM  pantoprazole, 40 mg, Oral, BID AC  ranolazine, 500 mg, Oral, Q12H  rosuvastatin, 20 mg, Oral, Nightly  sodium chloride, 10 mL, Intravenous, Q12H  tamsulosin, 0.4 mg, Oral, Nightly      Continuous Infusions:   PRN Meds:.  acetaminophen **OR** acetaminophen **OR** acetaminophen    senna-docusate sodium **AND** polyethylene glycol **AND** bisacodyl **AND** bisacodyl    Calcium Replacement - Follow Nurse / BPA Driven Protocol    Magnesium Cardiology Dose Replacement - Follow Nurse / BPA Driven Protocol    Phosphorus Replacement - Follow Nurse / BPA Driven Protocol    Potassium Replacement - Follow Nurse / BPA Driven Protocol    [COMPLETED] Insert Peripheral IV **AND** sodium chloride    sodium chloride     sodium chloride    Assessment & Plan   Assessment & Plan     Active Hospital Problems    Diagnosis  POA    **Subclavian steal syndrome of right subclavian artery [G45.8]  Yes    Stroke [I63.9]  Yes    Carotid stenosis [I65.29]  Yes    Dyslipidemia [E78.5]  Yes    Episodic lightheadedness [R42]  Yes    Subclavian artery stenosis [I77.1]  Yes    Vertigo [R42]  Yes    Paraesophageal hiatal hernia [K44.9]  Yes    Hypothyroid [E03.9]  Yes    CAD (coronary artery disease) [I25.10]  Yes      Resolved Hospital Problems   No resolved problems to display.        Brief Hospital Course to date:  Jolanta Coppola is a 84 y.o. female with past medical history of coronary artery disease status post remote CABG, multiple TIAs, carotid stenosis, hypothyroidism, SIADH, hyperlipidemia, GERD.  She also has a history of progressive subclavian steal syndrome.  Patient had progressively worsening increased frequency of lightheadedness, dizziness, and syncope with ambulation or standing up abruptly.  Vascular surgery consulted for subclavian steal syndrome, planning for OR on Tuesday.     Subclavian artery stenosis, w/ Subclavian Steal syndrome  Progressive near syncopal episodes  Carotid stenosis  History of TIA  History of coronary artery disease status post remote CABG  -Progressive symptoms, impairing quality of life and functional status.  She had followed with vascular surgery at [facility].  She is status post revascularization of lithotripsy on right side March 2024.  She did have significant issues postoperatively, family reports significant anemia and difficulty recovering from surgery.  She initially experienced relief and then had progressive symptoms and has been particularly impairing quality of life over the past several weeks.   -MRI brain without acute intracranial abnormality  -echo 3/2/25: wnl  -on 3/2/25 had episode of dizziness; was orthostatic ( sbp lying 134, sitting 118, standing 98)mEKG sinus bradycardia, echo  wnl. Received IV fluids and decreased coreg dosing  --Vascular surgery consulted (Dr. Davis):  planning for OR tomorrow Tuesday 3/4 @ ~ 10am; continue asa, statin, ranolazine  -pt/ot evals after surgery    Hypertension  Orthostasis  -having some mild bradycardia and borderline hypotension at times  -on 3/1/25 decreased amlodipine to 5mg (from 10mg) and coreg to 3.125mg bid (from 6.25) w/ increase in BP but patient seems to tolerates this slightly elevated BP better (orthostatic hypotension) so continue current bp meds/dosing and monitor       History of SIADH  -Na currently normal, continue demeclocycline   -trend labs/sodium     History of urinary retention  -Continue Flomax     Hypothyroidism  -Continue home Synthroid     GERD  -Continue home PPI     Depression  -Continue citalopram     Age-related cognitive impairment  -Continue donepezil     Declines blood products due to Caodaism, okay with heparin.    Today's labs still pending    4am labs:  cbc,bmp,mag    Expected Discharge Location and Transportation: TBD (will have pt/ot eval after surgery)  Expected Discharge   Expected Discharge Date: 3/5/2025; Expected Discharge Time:      VTE Prophylaxis:  Pharmacologic VTE prophylaxis orders are present.         AM-PAC 6 Clicks Score (PT): 22 (03/03/25 0864)    CODE STATUS:   Code Status and Medical Interventions: CPR (Attempt to Resuscitate); Full Support   Ordered at: 02/25/25 6913     Level Of Support Discussed With:    Patient     Code Status (Patient has no pulse and is not breathing):    CPR (Attempt to Resuscitate)     Medical Interventions (Patient has pulse or is breathing):    Full Support       Jarvis Scruggs MD  03/03/25

## 2025-03-03 NOTE — PLAN OF CARE
Goal Outcome Evaluation:  Plan of Care Reviewed With: patient                                Problem: Adult Inpatient Plan of Care  Goal: Absence of Hospital-Acquired Illness or Injury  Intervention: Identify and Manage Fall Risk  Description: Perform standard risk assessment on admission using a validated tool or comprehensive approach appropriate to the patient; reassess fall risk frequently, with change in status or transfer to another level of care.  Communicate risk to interprofessional healthcare team; ensure fall risk visible cue.  Determine need for increased observation, equipment and environmental modification, as well as use of supportive, nonskid footwear.  Adjust safety measures to individual needs and identified risk factors.  Reinforce the importance of active participation with fall risk prevention, safety, and physical activity with the patient and family.  Perform regular intentional rounding to assess need for position change, pain assessment and personal needs, including assistance with toileting.  Recent Flowsheet Documentation  Taken 3/2/2025 1814 by Padmaja Roger RN  Safety Promotion/Fall Prevention:   safety round/check completed   room organization consistent   nonskid shoes/slippers when out of bed   mobility aid in reach   lighting adjusted   gait belt   fall prevention program maintained   clutter free environment maintained   assistive device/personal items within reach   activity supervised  Taken 3/2/2025 1653 by Padmaja Roger, RN  Safety Promotion/Fall Prevention:   safety round/check completed   toileting scheduled   room organization consistent   muscle strengthening facilitated   nonskid shoes/slippers when out of bed   mobility aid in reach   lighting adjusted   gait belt   fall prevention program maintained   elopement precautions   clutter free environment maintained   assistive device/personal items within reach   activity supervised  Taken 3/2/2025 1400 by Padmaja Roger  RN  Safety Promotion/Fall Prevention:   safety round/check completed   toileting scheduled   room organization consistent   nonskid shoes/slippers when out of bed   mobility aid in reach   lighting adjusted   fall prevention program maintained   gait belt   elopement precautions   clutter free environment maintained   assistive device/personal items within reach   activity supervised  Taken 3/2/2025 1255 by Padmaja Roger RN  Safety Promotion/Fall Prevention:   safety round/check completed   toileting scheduled   room organization consistent   nonskid shoes/slippers when out of bed   muscle strengthening facilitated   mobility aid in reach   lighting adjusted   gait belt   fall prevention program maintained   elopement precautions   clutter free environment maintained   assistive device/personal items within reach   activity supervised  Taken 3/2/2025 1030 by Padmaja Roger, RN  Safety Promotion/Fall Prevention:   safety round/check completed   room organization consistent   muscle strengthening facilitated   nonskid shoes/slippers when out of bed   lighting adjusted   mobility aid in reach   gait belt   fall prevention program maintained   elopement precautions   clutter free environment maintained   assistive device/personal items within reach   activity supervised  Taken 3/2/2025 0928 by Padmaja Roger, RN  Safety Promotion/Fall Prevention:   safety round/check completed   toileting scheduled   room organization consistent   nonskid shoes/slippers when out of bed   mobility aid in reach   lighting adjusted   gait belt   fall prevention program maintained   elopement precautions   clutter free environment maintained   assistive device/personal items within reach   activity supervised

## 2025-03-03 NOTE — PLAN OF CARE
Problem: Adult Inpatient Plan of Care  Goal: Absence of Hospital-Acquired Illness or Injury  Outcome: Progressing  Intervention: Identify and Manage Fall Risk  Recent Flowsheet Documentation  Taken 3/3/2025 0030 by Carla Mohr RN  Safety Promotion/Fall Prevention: safety round/check completed  Taken 3/2/2025 2045 by Carla Mohr RN  Safety Promotion/Fall Prevention:   assistive device/personal items within reach   clutter free environment maintained   fall prevention program maintained   room organization consistent   safety round/check completed  Intervention: Prevent Skin Injury  Recent Flowsheet Documentation  Taken 3/3/2025 0030 by Carla Mohr RN  Body Position: position changed independently  Taken 3/2/2025 2045 by aCrla Mohr RN  Body Position: position changed independently  Intervention: Prevent Infection  Recent Flowsheet Documentation  Taken 3/2/2025 2045 by Carla Mohr RN  Infection Prevention:   environmental surveillance performed   rest/sleep promoted   single patient room provided   Goal Outcome Evaluation:

## 2025-03-03 NOTE — CASE MANAGEMENT/SOCIAL WORK
Continued Stay Note  [facility]     Patient Name: Jolanta Coppola  MRN: [id number]  Today's Date: 3/3/2025    Admit Date: 2/25/2025    Plan: home   Discharge Plan       Row Name 03/03/25 0950       Plan    Plan Comments Pt not medically ready for discharge. Plan for surgery in the AM. CM will continue to follow for dc needs                   Discharge Codes    No documentation.                       Brea Orona RN

## 2025-03-04 PROCEDURE — B310YZZ FLUOROSCOPY OF THORACIC AORTA USING OTHER CONTRAST: ICD-10-PCS | Performed by: STUDENT IN AN ORGANIZED HEALTH CARE EDUCATION/TRAINING PROGRAM

## 2025-03-04 PROCEDURE — B341ZZ3 ULTRASONOGRAPHY OF RIGHT BRACHIOCEPHALIC-SUBCLAVIAN ARTERY, INTRAVASCULAR: ICD-10-PCS | Performed by: STUDENT IN AN ORGANIZED HEALTH CARE EDUCATION/TRAINING PROGRAM

## 2025-03-04 PROCEDURE — 37252 INTRVASC US NONCORONARY 1ST: CPT | Performed by: STUDENT IN AN ORGANIZED HEALTH CARE EDUCATION/TRAINING PROGRAM

## 2025-03-04 PROCEDURE — 75710 ARTERY X-RAYS ARM/LEG: CPT | Performed by: STUDENT IN AN ORGANIZED HEALTH CARE EDUCATION/TRAINING PROGRAM

## 2025-03-04 PROCEDURE — C1769 GUIDE WIRE: HCPCS | Performed by: STUDENT IN AN ORGANIZED HEALTH CARE EDUCATION/TRAINING PROGRAM

## 2025-03-04 PROCEDURE — C1874 STENT, COATED/COV W/DEL SYS: HCPCS | Performed by: STUDENT IN AN ORGANIZED HEALTH CARE EDUCATION/TRAINING PROGRAM

## 2025-03-04 PROCEDURE — 25510000002 IODIXANOL PER 1 ML: Performed by: STUDENT IN AN ORGANIZED HEALTH CARE EDUCATION/TRAINING PROGRAM

## 2025-03-04 PROCEDURE — 25010000002 FENTANYL CITRATE (PF) 50 MCG/ML SOLUTION: Performed by: STUDENT IN AN ORGANIZED HEALTH CARE EDUCATION/TRAINING PROGRAM

## 2025-03-04 PROCEDURE — C1753 CATH, INTRAVAS ULTRASOUND: HCPCS | Performed by: STUDENT IN AN ORGANIZED HEALTH CARE EDUCATION/TRAINING PROGRAM

## 2025-03-04 PROCEDURE — C1725 CATH, TRANSLUMIN NON-LASER: HCPCS | Performed by: STUDENT IN AN ORGANIZED HEALTH CARE EDUCATION/TRAINING PROGRAM

## 2025-03-04 PROCEDURE — 25010000002 LIDOCAINE PF 1% 1 % SOLUTION: Performed by: STUDENT IN AN ORGANIZED HEALTH CARE EDUCATION/TRAINING PROGRAM

## 2025-03-04 PROCEDURE — 25010000002 MIDAZOLAM PER 1 MG: Performed by: STUDENT IN AN ORGANIZED HEALTH CARE EDUCATION/TRAINING PROGRAM

## 2025-03-04 PROCEDURE — C1894 INTRO/SHEATH, NON-LASER: HCPCS | Performed by: STUDENT IN AN ORGANIZED HEALTH CARE EDUCATION/TRAINING PROGRAM

## 2025-03-04 PROCEDURE — 03733DZ DILATION OF RIGHT SUBCLAVIAN ARTERY WITH INTRALUMINAL DEVICE, PERCUTANEOUS APPROACH: ICD-10-PCS | Performed by: STUDENT IN AN ORGANIZED HEALTH CARE EDUCATION/TRAINING PROGRAM

## 2025-03-04 PROCEDURE — 25010000002 HEPARIN (PORCINE) PER 1000 UNITS: Performed by: STUDENT IN AN ORGANIZED HEALTH CARE EDUCATION/TRAINING PROGRAM

## 2025-03-04 PROCEDURE — 99232 SBSQ HOSP IP/OBS MODERATE 35: CPT | Performed by: INTERNAL MEDICINE

## 2025-03-04 PROCEDURE — B31HYZZ FLUOROSCOPY OF RIGHT UPPER EXTREMITY ARTERIES USING OTHER CONTRAST: ICD-10-PCS | Performed by: STUDENT IN AN ORGANIZED HEALTH CARE EDUCATION/TRAINING PROGRAM

## 2025-03-04 PROCEDURE — C1887 CATHETER, GUIDING: HCPCS | Performed by: STUDENT IN AN ORGANIZED HEALTH CARE EDUCATION/TRAINING PROGRAM

## 2025-03-04 PROCEDURE — C1760 CLOSURE DEV, VASC: HCPCS

## 2025-03-04 DEVICE — IMPLANTABLE DEVICE: Type: IMPLANTABLE DEVICE | Site: SUBCLAVIAN | Status: FUNCTIONAL

## 2025-03-04 RX ORDER — MIDAZOLAM HYDROCHLORIDE 1 MG/ML
INJECTION, SOLUTION INTRAMUSCULAR; INTRAVENOUS
Status: DISCONTINUED | OUTPATIENT
Start: 2025-03-04 | End: 2025-03-04 | Stop reason: HOSPADM

## 2025-03-04 RX ORDER — AMLODIPINE BESYLATE 5 MG/1
5 TABLET ORAL 2 TIMES DAILY
Status: DISCONTINUED | OUTPATIENT
Start: 2025-03-04 | End: 2025-03-05 | Stop reason: HOSPADM

## 2025-03-04 RX ORDER — HEPARIN SODIUM 1000 [USP'U]/ML
INJECTION, SOLUTION INTRAVENOUS; SUBCUTANEOUS
Status: DISCONTINUED | OUTPATIENT
Start: 2025-03-04 | End: 2025-03-04 | Stop reason: HOSPADM

## 2025-03-04 RX ORDER — LIDOCAINE HYDROCHLORIDE 10 MG/ML
INJECTION, SOLUTION EPIDURAL; INFILTRATION; INTRACAUDAL; PERINEURAL
Status: DISCONTINUED | OUTPATIENT
Start: 2025-03-04 | End: 2025-03-04 | Stop reason: HOSPADM

## 2025-03-04 RX ORDER — IODIXANOL 320 MG/ML
INJECTION, SOLUTION INTRAVASCULAR
Status: DISCONTINUED | OUTPATIENT
Start: 2025-03-04 | End: 2025-03-04 | Stop reason: HOSPADM

## 2025-03-04 RX ORDER — FENTANYL CITRATE 50 UG/ML
INJECTION, SOLUTION INTRAMUSCULAR; INTRAVENOUS
Status: DISCONTINUED | OUTPATIENT
Start: 2025-03-04 | End: 2025-03-04 | Stop reason: HOSPADM

## 2025-03-04 RX ADMIN — RANOLAZINE 500 MG: 500 TABLET, FILM COATED, EXTENDED RELEASE ORAL at 08:34

## 2025-03-04 RX ADMIN — CARVEDILOL 3.12 MG: 3.12 TABLET, FILM COATED ORAL at 18:25

## 2025-03-04 RX ADMIN — ROSUVASTATIN 20 MG: 20 TABLET, FILM COATED ORAL at 20:31

## 2025-03-04 RX ADMIN — Medication 1000 UNITS: at 08:34

## 2025-03-04 RX ADMIN — PANTOPRAZOLE SODIUM 40 MG: 40 TABLET, DELAYED RELEASE ORAL at 18:25

## 2025-03-04 RX ADMIN — CARVEDILOL 3.12 MG: 3.12 TABLET, FILM COATED ORAL at 08:34

## 2025-03-04 RX ADMIN — AMLODIPINE BESYLATE 5 MG: 5 TABLET ORAL at 08:34

## 2025-03-04 RX ADMIN — TAMSULOSIN HYDROCHLORIDE 0.4 MG: 0.4 CAPSULE ORAL at 20:31

## 2025-03-04 RX ADMIN — LEVOTHYROXINE SODIUM 50 MCG: 0.05 TABLET ORAL at 05:14

## 2025-03-04 RX ADMIN — Medication 10 ML: at 08:44

## 2025-03-04 RX ADMIN — ASPIRIN 81 MG CHEWABLE TABLET 81 MG: 81 TABLET CHEWABLE at 08:34

## 2025-03-04 RX ADMIN — DEMECLOCYCLINE HYDROCHLORIDE 150 MG: 150 TABLET, FILM COATED ORAL at 08:33

## 2025-03-04 RX ADMIN — CITALOPRAM HYDROBROMIDE 10 MG: 20 TABLET ORAL at 08:33

## 2025-03-04 RX ADMIN — DONEPEZIL HYDROCHLORIDE 5 MG: 5 TABLET ORAL at 20:31

## 2025-03-04 RX ADMIN — RANOLAZINE 500 MG: 500 TABLET, FILM COATED, EXTENDED RELEASE ORAL at 20:31

## 2025-03-04 RX ADMIN — PANTOPRAZOLE SODIUM 40 MG: 40 TABLET, DELAYED RELEASE ORAL at 08:36

## 2025-03-04 RX ADMIN — Medication 10 ML: at 20:32

## 2025-03-04 RX ADMIN — AMLODIPINE BESYLATE 5 MG: 5 TABLET ORAL at 20:29

## 2025-03-04 NOTE — PLAN OF CARE
Goal Outcome Evaluation:  Plan of Care Reviewed With: patient        Progress: no change     AOx4, VSS, RA, very pleasant, NPO at midnight for surgery @ 0900, x1 standby assist        Problem: Adult Inpatient Plan of Care  Goal: Absence of Hospital-Acquired Illness or Injury  Intervention: Identify and Manage Fall Risk  Description: Perform standard risk assessment on admission using a validated tool or comprehensive approach appropriate to the patient; reassess fall risk frequently, with change in status or transfer to another level of care.  Communicate risk to interprofessional healthcare team; ensure fall risk visible cue.  Determine need for increased observation, equipment and environmental modification, as well as use of supportive, nonskid footwear.  Adjust safety measures to individual needs and identified risk factors.  Reinforce the importance of active participation with fall risk prevention, safety, and physical activity with the patient and family.  Perform regular intentional rounding to assess need for position change, pain assessment and personal needs, including assistance with toileting.  Recent Flowsheet Documentation  Taken 3/3/2025 1830 by Padmaja Roger, RN  Safety Promotion/Fall Prevention:   safety round/check completed   room organization consistent   nonskid shoes/slippers when out of bed   mobility aid in reach   lighting adjusted   fall prevention program maintained   clutter free environment maintained   assistive device/personal items within reach   activity supervised  Taken 3/3/2025 1645 by Padmaja Roger, RN  Safety Promotion/Fall Prevention:   safety round/check completed   room organization consistent   toileting scheduled   nonskid shoes/slippers when out of bed   mobility aid in reach   lighting adjusted   gait belt   fall prevention program maintained   elopement precautions   clutter free environment maintained   assistive device/personal items within reach   activity  supervised  Taken 3/3/2025 1400 by Padmaja Roger, RN  Safety Promotion/Fall Prevention:   safety round/check completed   room organization consistent   nonskid shoes/slippers when out of bed   mobility aid in reach   gait belt   lighting adjusted   fall prevention program maintained   elopement precautions   clutter free environment maintained   assistive device/personal items within reach   activity supervised  Taken 3/3/2025 1205 by Padmaja Roger, RN  Safety Promotion/Fall Prevention:   safety round/check completed   room organization consistent   toileting scheduled   muscle strengthening facilitated   nonskid shoes/slippers when out of bed   lighting adjusted   mobility aid in reach   gait belt   fall prevention program maintained   elopement precautions   clutter free environment maintained   assistive device/personal items within reach   activity supervised  Taken 3/3/2025 1030 by Padmaja Roger, RN  Safety Promotion/Fall Prevention:   safety round/check completed   room organization consistent   toileting scheduled   nonskid shoes/slippers when out of bed   muscle strengthening facilitated   mobility aid in reach   lighting adjusted   gait belt   fall prevention program maintained   elopement precautions   clutter free environment maintained   assistive device/personal items within reach   activity supervised  Taken 3/3/2025 0831 by Padmaja Roger, RN  Safety Promotion/Fall Prevention:   safety round/check completed   toileting scheduled   room organization consistent   muscle strengthening facilitated   nonskid shoes/slippers when out of bed   mobility aid in reach   lighting adjusted   gait belt   fall prevention program maintained   elopement precautions   clutter free environment maintained   assistive device/personal items within reach   activity supervised

## 2025-03-04 NOTE — OP NOTE
CV PERIPHERAL ANGIOGRAPHY  Procedure Report    Patient Name:  Jolanta Coppola  YOB: 1940    Date of Surgery:  3/4/2025     Indications: 84-year-old lady with concern for subclavian steal syndrome secondary to severe right subclavian artery stenosis identified on CT scan.  Patient with multiple bouts of dizziness and falling as well as weakness of the right arm use.  Is recommended she undergo right upper extremity angiogram to evaluate and treat the right subclavian artery stenosis.    Pre-op Diagnosis:   Subclavian artery stenosis [I77.1]       Post-Op Diagnosis Codes:     * Subclavian artery stenosis [I77.1]    Procedure/CPT® Codes:  CHG ANGIOGRAPHY EXTREMITY UNILATERAL RS&I [15112]  CHG ANGIOGRAPHY EXTREMITY BILATERAL RS&I [50680]    Procedure(s):  CV PERIPHERAL ANGIOGRAPHY of Right Upper Extremity +/- Right Subclavian Stent    Ultrasound-guided access of right common femoral artery  Arch aortogram  Right upper extremity angiogram   Intravascular ultrasound interpretation of right subclavian artery  Angioplasty and stenting of right subclavian artery with 7 mm x 19 mm VBX  6 Turkmen Angio-Seal closure right common femoral arteriotomy  Procedural sedation with IV fentanyl and Versed for 60 minutes        Staff:  Surgeon(s):  Abdi Davis DO    Scrub Person: Cheyenne Norton  Documenter: Shahnaz Georges  Invasive Nurse: Jigar Chang RN           Anesthesia: Local    Estimated Blood Loss: minimal    Implants:    Implant Name Type Inv. Item Serial No.  Lot No. LRB No. Used Action   STENTGR ENDOPROSTH VIABAHN VBX BALN/EXP HEP 6F [id number] 135CM - [id number] Implant STENTGR ENDOPROSTH VIABAHN VBX BALN/EXP HEP 6F [id number] 135CM   [employer] [id number] N/A 1 Implanted       Specimen:          None        Findings: Severe stenosis of right subclavian artery approximately 90% secondary to dense calcific plaque, successfully treated with angioplasty and stenting with less than 10% residual  stenosis, preservation of the right vertebral artery    Complications: None    Description of Procedure:     The patient was brought back to the operating room laid supine the operative table.  She was connected to hemodynamic monitoring.  A timeout was called indicating the proper patient surgical site.   the RN in the room administered IV sedative under my supervision.  I began with ultrasound-guided access of the right common femoral artery with a micropuncture needle wire ultimately a 6 Cambodian sheath was placed in the right common femoral artery.  Systemic heparin was administered.  I directed 035 Glidewire advantage to the aortic arch with an angled catheter.  I selected the innominate artery and with some difficulty was able to access the right subclavian artery and directly wire into the axillary artery.  I exchanged the short 6 Cambodian sheath for a 6 Cambodian by 65 cm sheath that was placed in the innominate artery.  I performed a arch aortogram through the sheath as well as a right upper extremity angiogram.  This revealed very tortuous right common carotid artery coming off the innominate making it difficult to see the stenosis I placed a body wire 018 wire into the common carotid artery to help splay the arteries and visualize the stenosis better.  I then exchanged the 035 wire for an 014 wire and performed intravascular ultrasound with interpretation of the right subclavian artery this identified severe calcific plaque and 90% stenosis of the right subclavian artery origin.  I then selected a 7 mm x 19 mm VBX stent, the 014 wire was exchanged for an 035 wire using an exchange catheter.  I then deployed the stent into the right subclavian artery.  I postdilated the distal end of the stent with a 10 mm balloon to flare it to size.  Completion angiogram showed brisk inline flow through the subclavian artery as well as preservation of the right vertebral artery and right common carotid arteries.  There is less  than 10% result stenosis noted.  At this point the case was complete the sheath was removed and the right common femoral arteriotomy was closed with 6 Sami Angio-Seal.      Abdi Davis,      Date: 3/4/2025  Time: 14:12 EST      What Type Of Note Output Would You Prefer (Optional)?: Standard Output How Severe Is Your Rash?: moderate Is This A New Presentation, Or A Follow-Up?: Rash

## 2025-03-04 NOTE — PROGRESS NOTES
[facility] Medicine Services  PROGRESS NOTE    Patient Name: Jolanta Coppola  : 1940  MRN: [id number]    Date of Admission: 2025  Primary Care Physician: Benita Bruce APRN    Subjective   Subjective     CC:  Syncope, subclavian steal    HPI:  Saw before surgery. No dyspnea. No dizziness. No chest pain. Eager to get the procedure performed      Objective   Objective     Vital Signs:   Temp:  [97.8 °F (36.6 °C)-98.2 °F (36.8 °C)] 98.1 °F (36.7 °C)  Heart Rate:  [54-70] 60  Resp:  [13-18] 13  BP: (144-186)/(57-82) 167/71  Flow (L/min) (Oxygen Therapy):  [2] 2     Alert, nontoxic sitting up in bed, normal respiratory effort, calm, comfortable appearing at rest; smiling  Ncat, oroph clear  rrr  ctab   Abd soft, nontender to palpation  Face symmetric, speech clear, equal , strength symmetric, in all extremities  No cce    Results Reviewed:  LAB RESULTS:      Lab 254 25   WBC 5.81 5.45  --   --  6.51   HEMOGLOBIN 10.7* 11.4*  --   --  12.3   HEMATOCRIT 33.6* 35.0  --   --  37.9   PLATELETS 222 224  --   --  282   NEUTROS ABS  --   --   --   --  3.43   IMMATURE GRANS (ABS)  --   --   --   --  0.03   LYMPHS ABS  --   --   --   --  2.13   MONOS ABS  --   --   --   --  0.65   EOS ABS  --   --   --   --  0.20   MCV 88.7 87.1  --   --  87.9   PROTIME  --   --   --   --  13.2   HSTROP T  --   --  13 17*  --          Lab 25  1043 25  0516 25  0751 02/25/25  2111 02/25/25  2052 02/25/25  2044   SODIUM 139 139  --  140  --   --  139   POTASSIUM 4.6 4.2  --  4.1  --   --  4.6   CHLORIDE 102 102  --  104  --   --  103   CO2 28.0 25.0  --  26.0  --   --  25.0   ANION GAP 9.0 12.0  --  10.0  --   --  11.0   BUN 15 13  --  13  --   --  18   CREATININE 0.93 0.72  --  0.81 1.20 1.20 1.13*   EGFR 60.0* 81.5  --  70.8  --   --  47.5*   GLUCOSE 108* 113*  --  86  --   --  99   CALCIUM 8.6 8.7   --  8.9  --   --  8.9   MAGNESIUM 2.0 1.8 2.2 1.9  --   --   --    HEMOGLOBIN A1C  --   --   --   --   --   --  5.50   TSH  --   --   --   --   --   --  3.020         Lab 02/25/25 2044   TOTAL PROTEIN 6.6   ALBUMIN 3.8   GLOBULIN 2.8   ALT (SGPT) 13   AST (SGOT) 26   BILIRUBIN 0.2   ALK PHOS 58         Lab 02/26/25  0024 02/25/25 2143 02/25/25 2044   HSTROP T 13 17*  --    PROTIME  --   --  13.2   INR  --   --  0.99         Lab 02/25/25 2143   CHOLESTEROL 124   LDL CHOL 45   HDL CHOL 58   TRIGLYCERIDES 118             Brief Urine Lab Results       None            Microbiology Results Abnormal       None            No radiology results from the last 24 hrs    Results for orders placed during the hospital encounter of 02/25/25    Adult Transthoracic Echo Complete W/ Cont if Necessary Per Protocol    Interpretation Summary    Left ventricular systolic function is normal. Estimated left ventricular EF = 65%    Left ventricular wall thickness is consistent with mild concentric hypertrophy.    No hemodynamically significant valvular heart disease      Current medications:  Scheduled Meds:amLODIPine, 5 mg, Oral, BID  [Transfer Hold] aspirin, 81 mg, Oral, Daily  carvedilol, 3.125 mg, Oral, BID With Meals  [Transfer Hold] cholecalciferol, 1,000 Units, Oral, Daily  [Transfer Hold] citalopram, 10 mg, Oral, Daily  [Transfer Hold] donepezil, 5 mg, Oral, Nightly  [Held by provider] heparin (porcine), 5,000 Units, Subcutaneous, Q8H  [Transfer Hold] levothyroxine, 50 mcg, Oral, Q AM  [Transfer Hold] pantoprazole, 40 mg, Oral, BID AC  [Transfer Hold] ranolazine, 500 mg, Oral, Q12H  [Transfer Hold] rosuvastatin, 20 mg, Oral, Nightly  [Transfer Hold] sodium chloride, 10 mL, Intravenous, Q12H  [Transfer Hold] tamsulosin, 0.4 mg, Oral, Nightly      Continuous Infusions:   PRN Meds:.  [Transfer Hold] acetaminophen **OR** [Transfer Hold] acetaminophen **OR** [Transfer Hold] acetaminophen    [Transfer Hold] senna-docusate sodium  **AND** [Transfer Hold] polyethylene glycol **AND** [Transfer Hold] bisacodyl **AND** [Transfer Hold] bisacodyl    [Transfer Hold] Calcium Replacement - Follow Nurse / BPA Driven Protocol    fentaNYL citrate (PF)    heparin (porcine)    lidocaine PF 1%    [Transfer Hold] Magnesium Cardiology Dose Replacement - Follow Nurse / BPA Driven Protocol    midazolam    O2    [Transfer Hold] Phosphorus Replacement - Follow Nurse / BPA Driven Protocol    Potassium Replacement - Follow Nurse / BPA Driven Protocol    [COMPLETED] Insert Peripheral IV **AND** [Transfer Hold] sodium chloride    [Transfer Hold] sodium chloride    [Transfer Hold] sodium chloride    Assessment & Plan   Assessment & Plan     Active Hospital Problems    Diagnosis  POA    **Subclavian steal syndrome of right subclavian artery [G45.8]  Yes    Stroke [I63.9]  Yes    Carotid stenosis [I65.29]  Yes    Dyslipidemia [E78.5]  Yes    Episodic lightheadedness [R42]  Yes    Subclavian artery stenosis [I77.1]  Yes    Vertigo [R42]  Yes    Paraesophageal hiatal hernia [K44.9]  Yes    Hypothyroid [E03.9]  Yes    CAD (coronary artery disease) [I25.10]  Yes      Resolved Hospital Problems   No resolved problems to display.        Brief Hospital Course to date:  Jolanta Coppola is a 84 y.o. female with past medical history of coronary artery disease status post remote CABG, multiple TIAs, carotid stenosis, hypothyroidism, SIADH, hyperlipidemia, GERD.  She also has a history of progressive subclavian steal syndrome.  Patient had progressively worsening increased frequency of lightheadedness, dizziness, and syncope with ambulation or standing up abruptly.  Vascular surgery consulted for subclavian steal syndrome, planning for OR on Tuesday.     Subclavian artery stenosis, w/ Subclavian Steal syndrome  Progressive near syncopal episodes  Carotid stenosis  History of TIA  History of coronary artery disease status post remote CABG  -Progressive symptoms, impairing quality of  life and functional status.  She had followed with vascular surgery at [facility].  She is status post revascularization of lithotripsy on right side March 2024.  She did have significant issues postoperatively, family reports significant anemia and difficulty recovering from surgery.  She initially experienced relief and then had progressive symptoms and has been particularly impairing quality of life over the past several weeks.   -MRI brain without acute intracranial abnormality  -echo 3/2/25: wnl  -on 3/2/25 had episode of dizziness; was orthostatic ( sbp lying 134, sitting 118, standing 98)mEKG sinus bradycardia, echo wnl. Received IV fluids and decreased coreg dosing  --Vascular surgery consulted (Dr. Davis):  planning for OR today  -continue asa, statin, ranolzaine     Hypertension  Orthostasis  -having some mild bradycardia and borderline hypotension at times  -on 3/1/25 decreased amlodipine to 5mg (from 10mg) and coreg to 3.125mg bid (from 6.25) w/ increase in BP but patient seems to tolerates this slightly elevated BP better  -now that BP higher (170's systolic) and undergoing subclavian stent placement today I will increase amlodipine to 5mg bid; titrate bp meds prn  -PT/OT evals pending     History of SIADH  -Na currently normal, continue demeclocycline   -trend labs/sodium     History of urinary retention  -Continue Flomax     Hypothyroidism  -Continue home Synthroid     GERD  -Continue home PPI     Depression  -Continue citalopram     Age-related cognitive impairment  -Continue donepezil     Declines blood products due to [employer], okay with heparin          Am labs: cbc,bmp,mag    Expected Discharge Location and Transportation: TBD (will have pt/ot eval after surgery)  Expected Discharge   Expected Discharge Date: 3/5/2025; Expected Discharge Time:      VTE Prophylaxis:  Pharmacologic VTE prophylaxis orders are present.         AM-PAC 6 Clicks Score (PT): 24 (03/04/25 0391)    CODE STATUS:    Code Status and Medical Interventions: CPR (Attempt to Resuscitate); Full Support   Ordered at: 02/25/25 9770     Level Of Support Discussed With:    Patient     Code Status (Patient has no pulse and is not breathing):    CPR (Attempt to Resuscitate)     Medical Interventions (Patient has pulse or is breathing):    Full Support       Jarvis Scruggs MD  03/04/25

## 2025-03-04 NOTE — PLAN OF CARE
Goal Outcome Evaluation:  Plan of Care Reviewed With: patient        Progress: no change  Outcome Evaluation: VSS on RA, pt rested well throughout shift, no c/o pain, ambulated to bathroom x2 with standby assist, walker, and gait belt, BM this shift, NPO since midnight for procedure, will continue to monitor.

## 2025-03-05 ENCOUNTER — READMISSION MANAGEMENT (OUTPATIENT)
Dept: CALL CENTER | Facility: HOSPITAL | Age: 85
End: 2025-03-05
Payer: MEDICARE

## 2025-03-05 VITALS
BODY MASS INDEX: 32.46 KG/M2 | SYSTOLIC BLOOD PRESSURE: 145 MMHG | HEIGHT: 60 IN | HEART RATE: 56 BPM | DIASTOLIC BLOOD PRESSURE: 62 MMHG | WEIGHT: 165.34 LBS | OXYGEN SATURATION: 97 % | RESPIRATION RATE: 18 BRPM | TEMPERATURE: 98.1 F

## 2025-03-05 LAB
ANION GAP SERPL CALCULATED.3IONS-SCNC: 12 MMOL/L (ref 5–15)
BUN SERPL-MCNC: 18 MG/DL (ref 8–23)
BUN/CREAT SERPL: 18 (ref 7–25)
CALCIUM SPEC-SCNC: 8.7 MG/DL (ref 8.6–10.5)
CHLORIDE SERPL-SCNC: 102 MMOL/L (ref 98–107)
CO2 SERPL-SCNC: 24 MMOL/L (ref 22–29)
CREAT SERPL-MCNC: 1 MG/DL (ref 0.57–1)
DEPRECATED RDW RBC AUTO: 45.9 FL (ref 37–54)
EGFRCR SERPLBLD CKD-EPI 2021: 55 ML/MIN/1.73
ERYTHROCYTE [DISTWIDTH] IN BLOOD BY AUTOMATED COUNT: 14.5 % (ref 12.3–15.4)
GLUCOSE SERPL-MCNC: 102 MG/DL (ref 65–99)
HCT VFR BLD AUTO: 34 % (ref 34–46.6)
HGB BLD-MCNC: 11 G/DL (ref 12–15.9)
MAGNESIUM SERPL-MCNC: 1.8 MG/DL (ref 1.6–2.4)
MCH RBC QN AUTO: 28.5 PG (ref 26.6–33)
MCHC RBC AUTO-ENTMCNC: 32.4 G/DL (ref 31.5–35.7)
MCV RBC AUTO: 88.1 FL (ref 79–97)
PLATELET # BLD AUTO: 222 10*3/MM3 (ref 140–450)
PMV BLD AUTO: 11.9 FL (ref 6–12)
POTASSIUM SERPL-SCNC: 4.6 MMOL/L (ref 3.5–5.2)
RBC # BLD AUTO: 3.86 10*6/MM3 (ref 3.77–5.28)
SODIUM SERPL-SCNC: 138 MMOL/L (ref 136–145)
WBC NRBC COR # BLD AUTO: 6.54 10*3/MM3 (ref 3.4–10.8)

## 2025-03-05 PROCEDURE — 80048 BASIC METABOLIC PNL TOTAL CA: CPT | Performed by: STUDENT IN AN ORGANIZED HEALTH CARE EDUCATION/TRAINING PROGRAM

## 2025-03-05 PROCEDURE — 83735 ASSAY OF MAGNESIUM: CPT | Performed by: STUDENT IN AN ORGANIZED HEALTH CARE EDUCATION/TRAINING PROGRAM

## 2025-03-05 PROCEDURE — 99239 HOSP IP/OBS DSCHRG MGMT >30: CPT | Performed by: INTERNAL MEDICINE

## 2025-03-05 PROCEDURE — 85027 COMPLETE CBC AUTOMATED: CPT | Performed by: STUDENT IN AN ORGANIZED HEALTH CARE EDUCATION/TRAINING PROGRAM

## 2025-03-05 RX ORDER — CLOPIDOGREL BISULFATE 75 MG/1
75 TABLET ORAL DAILY
Status: DISCONTINUED | OUTPATIENT
Start: 2025-03-05 | End: 2025-03-05 | Stop reason: HOSPADM

## 2025-03-05 RX ORDER — CITALOPRAM HYDROBROMIDE 10 MG/1
10 TABLET ORAL DAILY
Start: 2025-03-06

## 2025-03-05 RX ORDER — CARVEDILOL 6.25 MG/1
3.12 TABLET ORAL 2 TIMES DAILY
Start: 2025-03-05

## 2025-03-05 RX ORDER — MAGNESIUM SULFATE HEPTAHYDRATE 40 MG/ML
4 INJECTION, SOLUTION INTRAVENOUS ONCE
Status: DISCONTINUED | OUTPATIENT
Start: 2025-03-05 | End: 2025-03-05 | Stop reason: HOSPADM

## 2025-03-05 RX ORDER — CLOPIDOGREL BISULFATE 75 MG/1
75 TABLET ORAL DAILY
Qty: 30 TABLET | Refills: 2 | Status: SHIPPED | OUTPATIENT
Start: 2025-03-06

## 2025-03-05 RX ADMIN — CITALOPRAM HYDROBROMIDE 10 MG: 20 TABLET ORAL at 09:28

## 2025-03-05 RX ADMIN — CARVEDILOL 3.12 MG: 3.12 TABLET, FILM COATED ORAL at 09:28

## 2025-03-05 RX ADMIN — Medication 1000 UNITS: at 09:28

## 2025-03-05 RX ADMIN — AMLODIPINE BESYLATE 5 MG: 5 TABLET ORAL at 09:28

## 2025-03-05 RX ADMIN — Medication 10 ML: at 09:31

## 2025-03-05 RX ADMIN — ASPIRIN 81 MG CHEWABLE TABLET 81 MG: 81 TABLET CHEWABLE at 09:28

## 2025-03-05 RX ADMIN — CLOPIDOGREL BISULFATE 75 MG: 75 TABLET ORAL at 09:28

## 2025-03-05 RX ADMIN — LEVOTHYROXINE SODIUM 50 MCG: 0.05 TABLET ORAL at 05:14

## 2025-03-05 RX ADMIN — RANOLAZINE 500 MG: 500 TABLET, FILM COATED, EXTENDED RELEASE ORAL at 09:28

## 2025-03-05 RX ADMIN — PANTOPRAZOLE SODIUM 40 MG: 40 TABLET, DELAYED RELEASE ORAL at 09:29

## 2025-03-05 NOTE — PROGRESS NOTES
LOS: 8 days   Patient Care Team:  Benita Bruce APRN as PCP - General (Nurse Practitioner)      Subjective   Ms. Coppola is alert and sitting in bed eating breakfast without any family at bedside. She denies any new concerns or acute events following her procedure yesterday.    History taken from: patient    Objective     Vital Signs  Temp:  [97.8 °F (36.6 °C)-98.5 °F (36.9 °C)] 97.9 °F (36.6 °C)  Heart Rate:  [47-62] 51  Resp:  [13-18] 18  BP: (105-186)/(59-91) 145/67    Physical Exam  AAOx3, NAD, no family at bedside  Respiratory: normal effort  Abdomen: soft, non-tender  RIGHT UE: warm to touch, palpable brachial and radial pulse  RIGHT Groin: access site c/d/I with no hematoma, drainage or bleeding        Results Review:     I reviewed the patient's new clinical results.  CBC    Results from last 7 days   Lab Units 03/05/25  0813 03/03/25 2056 03/02/25  1043   WBC 10*3/mm3 6.54 5.81 5.45   HEMOGLOBIN g/dL 11.0* 10.7* 11.4*   PLATELETS 10*3/mm3 222 222 224     BMP   Results from last 7 days   Lab Units 03/03/25 2056 03/02/25  1043 02/27/25  0516   SODIUM mmol/L 139 139  --    POTASSIUM mmol/L 4.6 4.2  --    CHLORIDE mmol/L 102 102  --    CO2 mmol/L 28.0 25.0  --    BUN mg/dL 15 13  --    CREATININE mg/dL 0.93 0.72  --    GLUCOSE mg/dL 108* 113*  --    MAGNESIUM mg/dL 2.0 1.8 2.2          Current Facility-Administered Medications:     acetaminophen (TYLENOL) tablet 650 mg, 650 mg, Oral, Q4H PRN **OR** acetaminophen (TYLENOL) 160 MG/5ML oral solution 650 mg, 650 mg, Oral, Q4H PRN **OR** acetaminophen (TYLENOL) suppository 650 mg, 650 mg, Rectal, Q4H PRN, Abdi Davis S, DO    amLODIPine (NORVASC) tablet 5 mg, 5 mg, Oral, BID, Abdi Davis S, , 5 mg at 03/04/25 2029    aspirin chewable tablet 81 mg, 81 mg, Oral, Daily, IlAbdi hassan DO, 81 mg at 03/04/25 0834    sennosides-docusate (PERICOLACE) 8.6-50 MG per tablet 2 tablet, 2 tablet, Oral, BID PRN, 2 tablet at 03/03/25 0831 **AND** polyethylene glycol  (MIRALAX) packet 17 g, 17 g, Oral, Daily PRN **AND** bisacodyl (DULCOLAX) EC tablet 5 mg, 5 mg, Oral, Daily PRN **AND** bisacodyl (DULCOLAX) suppository 10 mg, 10 mg, Rectal, Daily PRN, Abdi Davis DO    Calcium Replacement - Follow Nurse / BPA Driven Protocol, , Not Applicable, PRN, Abdi Davis DO    carvedilol (COREG) tablet 3.125 mg, 3.125 mg, Oral, BID With Meals, Abdi Davis DO, 3.125 mg at 03/04/25 1825    cholecalciferol (VITAMIN D3) tablet 1,000 Units, 1,000 Units, Oral, Daily, Abdi Davis DO, 1,000 Units at 03/04/25 0834    citalopram (CeleXA) tablet 10 mg, 10 mg, Oral, Daily, Abdi Davis DO, 10 mg at 03/04/25 0833    donepezil (ARICEPT) tablet 5 mg, 5 mg, Oral, Nightly, Abdi Davis DO, 5 mg at 03/04/25 2031    [Held by provider] heparin (porcine) 5000 UNIT/ML injection 5,000 Units, 5,000 Units, Subcutaneous, Q8H, Yosef Markham MD, 5,000 Units at 03/03/25 0535    levothyroxine (SYNTHROID, LEVOTHROID) tablet 50 mcg, 50 mcg, Oral, Q AM, Abdi Davis DO, 50 mcg at 03/05/25 0514    Magnesium Cardiology Dose Replacement - Follow Nurse / BPA Driven Protocol, , Not Applicable, PRN, Abdi Davis DO    pantoprazole (PROTONIX) EC tablet 40 mg, 40 mg, Oral, BID AC, Abdi Davis DO, 40 mg at 03/04/25 1825    Phosphorus Replacement - Follow Nurse / BPA Driven Protocol, , Not Applicable, PRN, Abdi Davis DO    Potassium Replacement - Follow Nurse / BPA Driven Protocol, , Not Applicable, PRN, Abdi Davis DO    ranolazine (RANEXA) 12 hr tablet 500 mg, 500 mg, Oral, Q12H, Abdi Davis DO, 500 mg at 03/04/25 2031    rosuvastatin (CRESTOR) tablet 20 mg, 20 mg, Oral, Nightly, Abdi Davis DO, 20 mg at 03/04/25 2031    [COMPLETED] Insert Peripheral IV, , , Once **AND** sodium chloride 0.9 % flush 10 mL, 10 mL, Intravenous, PRN, Abdi Davis,     sodium chloride 0.9 % flush 10 mL, 10 mL, Intravenous, Q12H, Abdi Davis DO, 10 mL at 03/04/25 2032    sodium chloride 0.9 % flush 10 mL, 10  mL, Intravenous, PRN, Abdi Davis S, DO    sodium chloride 0.9 % infusion 40 mL, 40 mL, Intravenous, PRN, Abdi Davis S, DO    tamsulosin (FLOMAX) 24 hr capsule 0.4 mg, 0.4 mg, Oral, Nightly, Abdi Davis, DO, 0.4 mg at 03/04/25 2031     Assessment & Plan   RIGHT subclavian artery stenosis  - 3/4: (Susan) RIGHT subclavian artery stenting  - Continue aspirin and statin. Begin plavix  - PT/OT as tolerated  - RIGHT Groin dressing okay to be removed this afternoon by nursing staff.  - FU with Dr. Davis on 3/28/2025 at 11:45 AM with [facility]  - No further vascular surgery intervention required. Vascular surgery will sign off. Please contact on-call vascular surgeon with any questions or for re-consultation.        Dalia March PA-C  03/05/25  08:21 EST

## 2025-03-05 NOTE — DISCHARGE SUMMARY
[facility] Medicine Services  DISCHARGE SUMMARY    Patient Name: Jolanta Coppola  : 1940  MRN: [id number]    Date of Admission: 2025  8:19 PM  Date of Discharge:  3/5/2025  Primary Care Physician: Benita Bruce APRN    Consults       Date and Time Order Name Status Description    2025 11:18 PM Inpatient Vascular Surgery Consult Completed             Hospital Course     Presenting Problem: Lightheadedness     Active Hospital Problems    Diagnosis  POA   • **Subclavian steal syndrome of right subclavian artery [G45.8]  Yes   • Carotid stenosis [I65.29]  Yes   • Dyslipidemia [E78.5]  Yes   • Episodic lightheadedness [R42]  Yes   • Subclavian artery stenosis [I77.1]  Yes   • Vertigo [R42]  Yes   • Paraesophageal hiatal hernia [K44.9]  Yes   • Hypothyroid [E03.9]  Yes   • CAD (coronary artery disease) [I25.10]  Yes      Resolved Hospital Problems   No resolved problems to display.          Hospital Course:  Jolanta Coppola is a 84 y.o. female with coronary artery disease status post remote CABG, multiple TIAs, carotid stenosis, hypothyroidism, SIADH, hyperlipidemia, GERD.  She also has a history of progressive subclavian steal syndrome.  Patient had progressively worsening increased frequency of lightheadedness, dizziness, and syncope with ambulation or standing up abruptly.  She was found to have subclavian artery stenosis and vascular surgery consulted for subclavian steal syndrome.  She was taken for right subclavian artery stenting 3/4 by Dr. Davis.  She did well postoperatively and felt ready for discharge home 3/5.  She will take ASA and plavix at discharge and follow up with vascular surgery 3/28 as an outpatient.  She will resume home amlodipine 10mg daily at discharge.  Carvedilol was decreased to 3.125mg BID due to mild bradycardia.  She will follow up with her PCP in about a week to monitor blood pressure.     Discharge Follow Up Recommendations for outpatient  labs/diagnostics:  F/U Dr. Davis 3/28  F/U PCP 1 week for BP check    Day of Discharge     HPI:   She is eager to go home today.  Feeling well after subclavian stent.      Vital Signs:   Temp:  [97.8 °F (36.6 °C)-98.5 °F (36.9 °C)] 98.1 °F (36.7 °C)  Heart Rate:  [47-62] 56  Resp:  [13-18] 18  BP: (105-186)/(59-91) 145/62  Flow (L/min) (Oxygen Therapy):  [2] 2      Physical Exam:  Constitutional: No acute distress, awake, alert, sitting up in bed  HENT: NCAT, mucous membranes moist  Respiratory: Clear to auscultation bilaterally, respiratory effort normal   Cardiovascular: RRR, no murmurs  Musculoskeletal: No bilateral ankle edema  Psychiatric: Appropriate affect, cooperative  Neurologic: Speech clear and fluent  Skin: No rashes on exposed surfaces    Pertinent  and/or Most Recent Results     LAB RESULTS:      Lab 03/05/25 0813 03/03/25 2056 03/02/25  1043   WBC 6.54 5.81 5.45   HEMOGLOBIN 11.0* 10.7* 11.4*   HEMATOCRIT 34.0 33.6* 35.0   PLATELETS 222 222 224   MCV 88.1 88.7 87.1         Lab 03/05/25 0813 03/03/25 2056 03/02/25  1043 02/27/25  0516   SODIUM 138 139 139  --    POTASSIUM 4.6 4.6 4.2  --    CHLORIDE 102 102 102  --    CO2 24.0 28.0 25.0  --    ANION GAP 12.0 9.0 12.0  --    BUN 18 15 13  --    CREATININE 1.00 0.93 0.72  --    EGFR 55.0* 60.0* 81.5  --    GLUCOSE 102* 108* 113*  --    CALCIUM 8.7 8.6 8.7  --    MAGNESIUM 1.8 2.0 1.8 2.2                         Brief Urine Lab Results       None          Microbiology Results (last 10 days)       ** No results found for the last 240 hours. **            Cardiac Catheterization/Vascular Study    Result Date: 3/4/2025  See op report     Invasive peripheral vascular study    Result Date: 3/4/2025  See op report     MRI Brain Without Contrast    Result Date: 2/26/2025  MRI BRAIN WO CONTRAST Date of Exam: 2/25/2025 11:58 PM EST Indication: dizziness, near syncope, 90% R SCA stenosis.  Comparison: CT head without 2/25/2025. Technique:  Routine  multiplanar/multisequence sequence images of the brain were obtained without contrast administration. Findings: There is no diffusion restriction to suggest acute infarct. There is no evidence of acute or chronic intracranial hemorrhage. No mass effect or midline shift. No abnormal extra-axial collections. There is mild to moderate generalized atrophy. Mild subcortical and periventricular T2/FLAIR white matter hyperintensities most likely reflect the sequela of chronic microvascular ischemic disease. Calvarial and superficial soft tissue signal is within normal limits. Orbits appear unremarkable. The paranasal sinuses and the mastoid air cells appear well aerated.     Impression: 1. No acute intracranial process. 2. Age-related atrophy and chronic microvascular ischemic changes. Electronically Signed: Thalia Zamora MD  2/26/2025 6:54 AM EST  Workstation ID: [id number]    XR Chest 1 View    Result Date: 2/25/2025  XR CHEST 1 VW Date of Exam: 2/25/2025 9:13 PM EST Indication: near syncope Comparison: Chest radiograph 1/30/2007 Findings: Mediastinum: Cardiac silhouette appears unchanged including postoperative changes Lungs: The lungs appear clear without focal consolidation appreciated. Pleura: No pleural effusion or pneumothorax. Bones and soft tissues: No acute, displaced fracture seen. Median sternotomy.     Impression: No radiographic evidence of acute cardiopulmonary abnormality. Electronically Signed: Dima Good  2/25/2025 10:02 PM EST  Workstation ID: [id number]    CT Head Without Contrast    Result Date: 2/25/2025  CT HEAD WO CONTRAST, CT ANGIOGRAM NECK, CT ANGIOGRAM HEAD W AI ANALYSIS OF LVO Date of Exam: 2/25/2025 9:22 PM EST Indication: dizziness, near syncope. Comparison: None available. Technique: Axial CT images were obtained of the head without contrast administration.  Automated exposure control and iterative construction methods were used. CTA of the head and neck was performed after the uneventful  intravenous administration of 80 mL Isovue-370. Reconstructed coronal and sagittal images were also obtained. In addition, a 3-D volume rendered image was created for interpretation. Automated exposure control and iterative reconstruction methods were used.  Findings: Noncontrast head: No acute intracranial hemorrhage.Intact appearing gray-white differentiation.No extra-axial fluid collection.No significant mass effect. No hydrocephalus. Mild generalized parenchymal volume loss. Scattered areas of periventricular and subcortical white matter hypoattenuation, nonspecific, perhaps from small vessel ischemic/hypertensive changes in a patient of this age.There are intracranial atherosclerotic calcifications. Mild scattered mucosal thickening in the paranasal sinuses.Mastoid air cells are essentially clear.. Bilateral lens replacements. No acute or aggressive appearing bony or extracranial soft tissue process. HEAD CTA: Anterior circulation: No proximal large vessel occlusion or major stenosis. Posterior circulation: No proximal large vessel occlusion or major stenosis. Major dural venous sinuses: No evidence of dural venous sinus thrombosis within the limitation of angiographic contrast imaging. NECK CTA: Normal variant common origin of the right brachiocephalic and left common carotid arteries. Mild multifocal narrowing in the common carotid arteries from their origins to the bifurcation. Irregularity and atherosclerotic calcification at the carotid bulbs. There is less than 50% narrowing of the right ICA by NASCET criteria at its origin. There is moderate stenosis of the distal cervical right ICA. There is less than 50% narrowing of the left ICA by NASCET criteria at its origin. Otherwise, normal contrast enhancement of the internal carotid arteries from their origins to the proximal intradural portions.Irregularity and atherosclerotic calcification of the petrous and cavernous carotid arteries. There is diminutive to  absent opacification of the cervical right vertebral artery from the distal V1 segments to the mid to distal V2 segment. The cervical left vertebral artery appears patent. The left vertebral artery is dominant. There is mild stenosis at the proximal left subclavian artery. 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 Angiographic contrast timing precludes accurate assessement of jugular vein patency. SOFT TISSUE NECK: No exophytic lesion seen within the aerodigestive tract. No pathologic appearing lymph nodes by imaging criteria. The visualized glands appear unremarkable. No acute or aggressive appearing osseous or soft tissue process.Degenerative changes of the imaged spine. Emphysema. Biapical pleural-parenchymal scarring. Status post CABG     Impression: No acute intracranial findings on noncontrast head CT. Brain MRI would be more sensitive for the evaluation of acute ischemia in the setting of persistent focal neurologic deficits. No proximal large vessel occlusion or major stenosis of the intracranial vasculature. There is diminutive to absent opacification of the cervical right vertebral artery from the distal V1 segment to the mid to distal V2 segment. There is severe stenosis of the proximal right subclavian artery. Findings can be seen with subclavian steal syndrome. Correlation with Doppler ultrasound is recommended; reported prior carotid ultrasound demonstrated no evidence of subclavian steal syndrome, though images aren't available for direct comparison. Moderate stenosis of the distal cervical right ICA. Critical Findings were verbally communicated with POLLY Gill by Dima Good MD on 2/25/2025 at 10:00 p.m. Electronically Signed: Dima Good  2/25/2025 10:00 PM EST  Workstation ID: [id number]    CT Angiogram Head w AI Analysis of LVO    Result Date: 2/25/2025  CT HEAD WO CONTRAST, CT ANGIOGRAM NECK, CT ANGIOGRAM HEAD W AI ANALYSIS OF LVO Date of  Exam: 2/25/2025 9:22 PM EST Indication: dizziness, near syncope. Comparison: None available. Technique: Axial CT images were obtained of the head without contrast administration.  Automated exposure control and iterative construction methods were used. CTA of the head and neck was performed after the uneventful intravenous administration of 80 mL Isovue-370. Reconstructed coronal and sagittal images were also obtained. In addition, a 3-D volume rendered image was created for interpretation. Automated exposure control and iterative reconstruction methods were used.  Findings: Noncontrast head: No acute intracranial hemorrhage.Intact appearing gray-white differentiation.No extra-axial fluid collection.No significant mass effect. No hydrocephalus. Mild generalized parenchymal volume loss. Scattered areas of periventricular and subcortical white matter hypoattenuation, nonspecific, perhaps from small vessel ischemic/hypertensive changes in a patient of this age.There are intracranial atherosclerotic calcifications. Mild scattered mucosal thickening in the paranasal sinuses.Mastoid air cells are essentially clear.. Bilateral lens replacements. No acute or aggressive appearing bony or extracranial soft tissue process. HEAD CTA: Anterior circulation: No proximal large vessel occlusion or major stenosis. Posterior circulation: No proximal large vessel occlusion or major stenosis. Major dural venous sinuses: No evidence of dural venous sinus thrombosis within the limitation of angiographic contrast imaging. NECK CTA: Normal variant common origin of the right brachiocephalic and left common carotid arteries. Mild multifocal narrowing in the common carotid arteries from their origins to the bifurcation. Irregularity and atherosclerotic calcification at the carotid bulbs. There is less than 50% narrowing of the right ICA by NASCET criteria at its origin. There is moderate stenosis of the distal cervical right ICA. There is less  than 50% narrowing of the left ICA by NASCET criteria at its origin. Otherwise, normal contrast enhancement of the internal carotid arteries from their origins to the proximal intradural portions.Irregularity and atherosclerotic calcification of the petrous and cavernous carotid arteries. There is diminutive to absent opacification of the cervical right vertebral artery from the distal V1 segments to the mid to distal V2 segment. The cervical left vertebral artery appears patent. The left vertebral artery is dominant. There is mild stenosis at the proximal left subclavian artery. 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 Angiographic contrast timing precludes accurate assessement of jugular vein patency. SOFT TISSUE NECK: No exophytic lesion seen within the aerodigestive tract. No pathologic appearing lymph nodes by imaging criteria. The visualized glands appear unremarkable. No acute or aggressive appearing osseous or soft tissue process.Degenerative changes of the imaged spine. Emphysema. Biapical pleural-parenchymal scarring. Status post CABG     Impression: No acute intracranial findings on noncontrast head CT. Brain MRI would be more sensitive for the evaluation of acute ischemia in the setting of persistent focal neurologic deficits. No proximal large vessel occlusion or major stenosis of the intracranial vasculature. There is diminutive to absent opacification of the cervical right vertebral artery from the distal V1 segment to the mid to distal V2 segment. There is severe stenosis of the proximal right subclavian artery. Findings can be seen with subclavian steal syndrome. Correlation with Doppler ultrasound is recommended; reported prior carotid ultrasound demonstrated no evidence of subclavian steal syndrome, though images aren't available for direct comparison. Moderate stenosis of the distal cervical right ICA. Critical Findings were verbally  communicated with POLLY Gill by Dima Good MD on 2/25/2025 at 10:00 p.m. Electronically Signed: Dima Good  2/25/2025 10:00 PM EST  Workstation ID: [id number]    CT Angiogram Neck    Result Date: 2/25/2025  CT HEAD WO CONTRAST, CT ANGIOGRAM NECK, CT ANGIOGRAM HEAD W AI ANALYSIS OF LVO Date of Exam: 2/25/2025 9:22 PM EST Indication: dizziness, near syncope. Comparison: None available. Technique: Axial CT images were obtained of the head without contrast administration.  Automated exposure control and iterative construction methods were used. CTA of the head and neck was performed after the uneventful intravenous administration of 80 mL Isovue-370. Reconstructed coronal and sagittal images were also obtained. In addition, a 3-D volume rendered image was created for interpretation. Automated exposure control and iterative reconstruction methods were used.  Findings: Noncontrast head: No acute intracranial hemorrhage.Intact appearing gray-white differentiation.No extra-axial fluid collection.No significant mass effect. No hydrocephalus. Mild generalized parenchymal volume loss. Scattered areas of periventricular and subcortical white matter hypoattenuation, nonspecific, perhaps from small vessel ischemic/hypertensive changes in a patient of this age.There are intracranial atherosclerotic calcifications. Mild scattered mucosal thickening in the paranasal sinuses.Mastoid air cells are essentially clear.. Bilateral lens replacements. No acute or aggressive appearing bony or extracranial soft tissue process. HEAD CTA: Anterior circulation: No proximal large vessel occlusion or major stenosis. Posterior circulation: No proximal large vessel occlusion or major stenosis. Major dural venous sinuses: No evidence of dural venous sinus thrombosis within the limitation of angiographic contrast imaging. NECK CTA: Normal variant common origin of the right brachiocephalic and left common carotid arteries. Mild multifocal  narrowing in the common carotid arteries from their origins to the bifurcation. Irregularity and atherosclerotic calcification at the carotid bulbs. There is less than 50% narrowing of the right ICA by NASCET criteria at its origin. There is moderate stenosis of the distal cervical right ICA. There is less than 50% narrowing of the left ICA by NASCET criteria at its origin. Otherwise, normal contrast enhancement of the internal carotid arteries from their origins to the proximal intradural portions.Irregularity and atherosclerotic calcification of the petrous and cavernous carotid arteries. There is diminutive to absent opacification of the cervical right vertebral artery from the distal V1 segments to the mid to distal V2 segment. The cervical left vertebral artery appears patent. The left vertebral artery is dominant. There is mild stenosis at the proximal left subclavian artery. 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 Angiographic contrast timing precludes accurate assessement of jugular vein patency. SOFT TISSUE NECK: No exophytic lesion seen within the aerodigestive tract. No pathologic appearing lymph nodes by imaging criteria. The visualized glands appear unremarkable. No acute or aggressive appearing osseous or soft tissue process.Degenerative changes of the imaged spine. Emphysema. Biapical pleural-parenchymal scarring. Status post CABG     Impression: No acute intracranial findings on noncontrast head CT. Brain MRI would be more sensitive for the evaluation of acute ischemia in the setting of persistent focal neurologic deficits. No proximal large vessel occlusion or major stenosis of the intracranial vasculature. There is diminutive to absent opacification of the cervical right vertebral artery from the distal V1 segment to the mid to distal V2 segment. There is severe stenosis of the proximal right subclavian artery. Findings can be seen with  subclavian steal syndrome. Correlation with Doppler ultrasound is recommended; reported prior carotid ultrasound demonstrated no evidence of subclavian steal syndrome, though images aren't available for direct comparison. Moderate stenosis of the distal cervical right ICA. Critical Findings were verbally communicated with POLLY Gill by Dima Good MD on 2/25/2025 at 10:00 p.m. Electronically Signed: Dima Good  2/25/2025 10:00 PM EST  Workstation ID: [id number]             Results for orders placed during the hospital encounter of 02/25/25    Adult Transthoracic Echo Complete W/ Cont if Necessary Per Protocol    Interpretation Summary  •  Left ventricular systolic function is normal. Estimated left ventricular EF = 65%  •  Left ventricular wall thickness is consistent with mild concentric hypertrophy.  •  No hemodynamically significant valvular heart disease      Plan for Follow-up of Pending Labs/Results: None pending    Discharge Details        Discharge Medications        New Medications        Instructions Start Date   citalopram 10 MG tablet  Commonly known as: CeleXA   10 mg, Oral, Daily   Start Date: March 6, 2025     clopidogrel 75 MG tablet  Commonly known as: PLAVIX   75 mg, Oral, Daily   Start Date: March 6, 2025            Changes to Medications        Instructions Start Date   carvedilol 6.25 MG tablet  Commonly known as: COREG  What changed: how much to take   3.125 mg, Oral, 2 Times Daily             Continue These Medications        Instructions Start Date   amLODIPine 10 MG tablet  Commonly known as: NORVASC   10 mg, Oral, Daily      aspirin 81 MG chewable tablet   81 mg, Oral, Daily, Per d/c med rec      cetirizine 10 MG tablet  Commonly known as: zyrTEC   10 mg, Oral, Nightly, Per d/c med rec      cholecalciferol 1.25 MG (55832 UT) capsule  Commonly known as: VITAMIN D3   2,000 Units, Oral, Daily, Per discharge med rec      demeclocycline 150 MG tablet  Commonly known as: DECLOMYCIN   150 mg,  Oral, 2 Times Daily      donepezil 5 MG tablet  Commonly known as: ARICEPT   5 mg, Oral, Nightly, Per d/c med rec      esomeprazole 40 MG capsule  Commonly known as: nexIUM   40 mg, Oral, 2 Times Daily      furosemide 20 MG tablet  Commonly known as: LASIX   20 mg, Oral, Daily      levothyroxine 50 MCG tablet  Commonly known as: SYNTHROID, LEVOTHROID   50 mcg, Oral, Every Early Morning, Per d/c med rec      ranolazine 500 MG 12 hr tablet  Commonly known as: RANEXA   500 mg, Oral, Every 12 Hours, Per d/c med rec      rosuvastatin 20 MG tablet  Commonly known as: CRESTOR   20 mg, Oral, Daily      tamsulosin 0.4 MG capsule 24 hr capsule  Commonly known as: FLOMAX   1 capsule, Oral, Daily             Stop These Medications      pantoprazole 40 MG EC tablet  Commonly known as: PROTONIX              Allergies   Allergen Reactions   • Scopolamine Confusion   • Valsartan Unknown - High Severity   • Meclizine Hallucinations   • Red Blood Cells Other (See Comments)     Declines blood for reasons related to [employer]         Discharge Disposition:  Home or Self Care    Diet:  Hospital:  Diet Order   Procedures   • Diet: Cardiac; Healthy Heart (2-3 Na+); Fluid Consistency: Thin (IDDSI 0)          CODE STATUS:    Code Status and Medical Interventions: CPR (Attempt to Resuscitate); Full Support   Ordered at: 02/25/25 8325     Level Of Support Discussed With:    Patient     Code Status (Patient has no pulse and is not breathing):    CPR (Attempt to Resuscitate)     Medical Interventions (Patient has pulse or is breathing):    Full Support       No future appointments.    Additional Instructions for the Follow-ups that You Need to Schedule       Discharge Follow-up with PCP   As directed       Currently Documented PCP:    Benita Bruce APRN    PCP Phone Number:    [phone]     Follow Up Details: 1 week        Discharge Follow-up with Specified Provider: FU with Dr. Davis on 3/28/2025 at 11:45 AM with [facility]   As directed       To: MAGGY with Dr. Davis on 3/28/2025 at 11:45 AM with TOOTIE Matthews MD  03/05/25      Time Spent on Discharge:  I spent  31  minutes on this discharge activity which included: face-to-face encounter with the patient, reviewing the data in the system, coordination of the care with the nursing staff as well as consultants, documentation, and entering orders.

## 2025-03-06 NOTE — OUTREACH NOTE
Prep Survey      Flowsheet Row Responses   Denominational facility patient discharged from? [facility]   Is LACE score < 7 ? 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 Other   Does the patient have Home health ordered? No   Is there a DME ordered? No   Prep survey completed? Yes            [place] BARBER - Registered Nurse

## 2025-03-11 ENCOUNTER — READMISSION MANAGEMENT (OUTPATIENT)
Dept: CALL CENTER | Facility: HOSPITAL | Age: 85
End: 2025-03-11
Payer: MEDICARE

## 2025-03-11 NOTE — OUTREACH NOTE
Medical Week 1 Survey      Flowsheet Row Responses   [facility] patient discharged from? [place]   Does the patient have one of the following disease processes/diagnoses(primary or secondary)? Other   Week 1 attempt successful? 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 Yes   Is the patient having any side effects they believe may be caused by any medication additions or changes? No   Does the patient have all medications ordered at discharge? Yes   Is the patient taking all medications as directed (includes completed medication regime)? Yes   Does the patient have a primary care provider?  Yes   Does the patient have an appointment with their PCP within 7 days of discharge? Yes   Has the patient kept scheduled appointments due by today? N/A   Psychosocial issues? No   Did the patient receive a copy of their discharge instructions? Yes   Nursing interventions Reviewed instructions with patient   What is the patient's perception of their health status since discharge? Improving   Is the patient/caregiver able to teach back signs and symptoms related to disease process for when to call PCP? Yes   Is the patient/caregiver able to teach back signs and symptoms related to disease process for when to call 911? Yes   Is the patient/caregiver able to teach back the hierarchy of who to call/visit for symptoms/problems? PCP, Specialist, Home health nurse, Urgent Care, ED, 911 Yes   If the patient is a current smoker, are they able to teach back resources for cessation? Not a smoker   Week 1 call completed? Yes   Would this patient benefit from a Referral to Amb Social Work? No   Is the patient interested in additional calls from an ambulatory ? No    Wrap up additional comments [place], dtr states pt normally does have dizziness but is doing better. Dtr denies pt has any fever, or increased redness, swelling, drainage at incisonal site. Reviewed AVS/new medication changes with dtr. Pt has PCP fu appt on 3/13/25.   Call end time 1120            Ashley BARBER - Registered Nurse

## 2025-03-20 ENCOUNTER — READMISSION MANAGEMENT (OUTPATIENT)
Dept: CALL CENTER | Facility: HOSPITAL | Age: 85
End: 2025-03-20
Payer: MEDICARE

## 2025-03-20 NOTE — OUTREACH NOTE
Medical Week 2 Survey      Flowsheet Row Responses   [facility] patient discharged from? [place]   Does the patient have one of the following disease processes/diagnoses(primary or secondary)? Other   Week 2 attempt successful? 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 Yes   Is the patient taking all medications as directed (includes completed medication regime)? Yes   Does the patient have a primary care provider?  Yes   Does the patient have an appointment with their PCP within 7 days of discharge? Yes   Has the patient kept scheduled appointments due by today? Yes   Has home health visited the patient within 72 hours of discharge? N/A   Psychosocial issues? No   Did the patient receive a copy of their discharge instructions? Yes   Nursing interventions Reviewed instructions with patient   What is the patient's perception of their health status since discharge? Improving   Is the patient/caregiver able to teach back signs and symptoms related to disease process for when to call PCP? Yes   Is the patient/caregiver able to teach back signs and symptoms related to disease process for when to call 911? Yes   Is the patient/caregiver able to teach back the hierarchy of who to call/visit for symptoms/problems? PCP, Specialist, Home health nurse, Urgent Care, ED, 911 Yes   Week 2 Call Completed? Yes   Graduated Yes   Graduated/Revoked comments Daughter reports Pt is doing well. Site looks good no issues. Pt had been going to appts and will continue to FU. No needs.   Call end time 1234            DREW DAVIS - Registered Nurse

## (undated) DEVICE — HI-TORQUE STEELCORE 18 PERIPHERAL GUIDEWIRE 300 CM: Brand: HI-TORQUE STEELCORE

## (undated) DEVICE — RADIFOCUS GLIDEWIRE ADVANTAGE GUIDEWIRE: Brand: GLIDEWIRE ADVANTAGE

## (undated) DEVICE — PINNACLE INTRODUCER SHEATH: Brand: PINNACLE

## (undated) DEVICE — KT VLV HEMO MAP ACC PLS LG/BORE MTL/INTRO W/TORQ/DEV

## (undated) DEVICE — RADIFOCUS GLIDEWIRE ADVANTAGE TRACK GUIDE WIRE: Brand: GLIDEWIRE ADVANTAGE TRACK

## (undated) DEVICE — RADIFOCUS GLIDECATH: Brand: GLIDECATH

## (undated) DEVICE — ADULT, W/LG. BACK PAD, RADIOTRANSPARENT ELEMENT AND LEAD WIRE COMPATIBLE W/: Brand: DEFIBRILLATION ELECTRODES

## (undated) DEVICE — DEV INFL MONARCH 25W

## (undated) DEVICE — LHK- LEFT HEART KIT BAPTIST: Brand: MEDLINE INDUSTRIES, INC.

## (undated) DEVICE — ST ACC MICROPUNCTURE .018 TRANSLSS/SS/TP 5F/10CM 21G/7CM

## (undated) DEVICE — BALN EVERCROSS OTW .035 6F 10X20MM 80CM

## (undated) DEVICE — FORCEPS BX L240CM JAW DIA2.8MM L CAP W/ NDL MIC MESH TOOTH

## (undated) DEVICE — CVR PROB ULTRASND/TRANSD W/GEL 7X11IN STRL

## (undated) DEVICE — CATH TEMPO 5F MAN 100CM: Brand: TEMPO

## (undated) DEVICE — CATH IMG IVUS EAGLE EYE PLATIN RX DIGITAL .014IN 5FR

## (undated) DEVICE — LEX NEURO ANGIOGRAPHY: Brand: MEDLINE INDUSTRIES, INC.

## (undated) DEVICE — DESTINATION PERIPHERAL GUIDING SHEATH: Brand: DESTINATION